# Patient Record
Sex: MALE | Race: WHITE | NOT HISPANIC OR LATINO | Employment: OTHER | URBAN - METROPOLITAN AREA
[De-identification: names, ages, dates, MRNs, and addresses within clinical notes are randomized per-mention and may not be internally consistent; named-entity substitution may affect disease eponyms.]

---

## 2017-03-01 ENCOUNTER — RECORDS - HEALTHEAST (OUTPATIENT)
Dept: LAB | Facility: CLINIC | Age: 82
End: 2017-03-01

## 2017-03-01 LAB
CHOLEST SERPL-MCNC: 158 MG/DL
FASTING STATUS PATIENT QL REPORTED: NORMAL
HDLC SERPL-MCNC: 57 MG/DL
LDLC SERPL CALC-MCNC: 77 MG/DL
TRIGL SERPL-MCNC: 120 MG/DL

## 2017-05-01 ASSESSMENT — MIFFLIN-ST. JEOR
SCORE: 1798.9
SCORE: 1798.9

## 2017-05-03 ENCOUNTER — SURGERY - HEALTHEAST (OUTPATIENT)
Dept: GASTROENTEROLOGY | Facility: CLINIC | Age: 82
End: 2017-05-03

## 2017-05-08 ASSESSMENT — MIFFLIN-ST. JEOR: SCORE: 1819.77

## 2017-05-09 ENCOUNTER — OFFICE VISIT - HEALTHEAST (OUTPATIENT)
Dept: GERIATRICS | Facility: CLINIC | Age: 82
End: 2017-05-09

## 2017-05-09 DIAGNOSIS — Z95.0 CARDIAC PACEMAKER IN SITU: ICD-10-CM

## 2017-05-09 DIAGNOSIS — R19.7 DIARRHEA WITH DEHYDRATION: ICD-10-CM

## 2017-05-09 DIAGNOSIS — R42 DIZZINESS: ICD-10-CM

## 2017-05-09 DIAGNOSIS — R19.7 DIARRHEA OF PRESUMED INFECTIOUS ORIGIN: ICD-10-CM

## 2017-05-09 DIAGNOSIS — N18.30 CHRONIC KIDNEY DISEASE, STAGE 3 (MODERATE): ICD-10-CM

## 2017-05-09 DIAGNOSIS — E11.9 TYPE 2 DIABETES MELLITUS WITHOUT COMPLICATION, WITHOUT LONG-TERM CURRENT USE OF INSULIN (H): ICD-10-CM

## 2017-05-09 DIAGNOSIS — I48.0 PAROXYSMAL ATRIAL FIBRILLATION (H): ICD-10-CM

## 2017-05-09 DIAGNOSIS — E78.5 HYPERLIPIDEMIA, UNSPECIFIED HYPERLIPIDEMIA TYPE: ICD-10-CM

## 2017-05-09 DIAGNOSIS — I44.1 MOBITZ TYPE II ATRIOVENTRICULAR BLOCK: ICD-10-CM

## 2017-05-09 DIAGNOSIS — M15.0 PRIMARY OSTEOARTHRITIS INVOLVING MULTIPLE JOINTS: ICD-10-CM

## 2017-05-09 DIAGNOSIS — I10 ESSENTIAL HYPERTENSION WITH GOAL BLOOD PRESSURE LESS THAN 140/90: ICD-10-CM

## 2017-05-11 ENCOUNTER — OFFICE VISIT - HEALTHEAST (OUTPATIENT)
Dept: GERIATRICS | Facility: CLINIC | Age: 82
End: 2017-05-11

## 2017-05-11 DIAGNOSIS — E11.9 TYPE 2 DIABETES MELLITUS WITHOUT COMPLICATION, WITHOUT LONG-TERM CURRENT USE OF INSULIN (H): ICD-10-CM

## 2017-05-11 DIAGNOSIS — I48.0 PAROXYSMAL ATRIAL FIBRILLATION (H): ICD-10-CM

## 2017-05-11 DIAGNOSIS — R42 DIZZINESS: ICD-10-CM

## 2017-05-11 DIAGNOSIS — R19.7 DIARRHEA WITH DEHYDRATION: ICD-10-CM

## 2017-05-11 DIAGNOSIS — I10 ESSENTIAL HYPERTENSION WITH GOAL BLOOD PRESSURE LESS THAN 140/90: ICD-10-CM

## 2017-05-16 ENCOUNTER — OFFICE VISIT - HEALTHEAST (OUTPATIENT)
Dept: GERIATRICS | Facility: CLINIC | Age: 82
End: 2017-05-16

## 2017-05-16 DIAGNOSIS — M15.0 PRIMARY OSTEOARTHRITIS INVOLVING MULTIPLE JOINTS: ICD-10-CM

## 2017-05-16 DIAGNOSIS — R42 DIZZINESS: ICD-10-CM

## 2017-05-16 DIAGNOSIS — I44.1 MOBITZ TYPE II ATRIOVENTRICULAR BLOCK: ICD-10-CM

## 2017-05-16 DIAGNOSIS — I10 ESSENTIAL HYPERTENSION WITH GOAL BLOOD PRESSURE LESS THAN 140/90: ICD-10-CM

## 2017-05-16 DIAGNOSIS — Z95.0 CARDIAC PACEMAKER IN SITU: ICD-10-CM

## 2017-05-16 DIAGNOSIS — N18.30 CHRONIC KIDNEY DISEASE, STAGE 3 (MODERATE): ICD-10-CM

## 2017-05-16 DIAGNOSIS — E11.9 TYPE 2 DIABETES MELLITUS WITHOUT COMPLICATION, WITHOUT LONG-TERM CURRENT USE OF INSULIN (H): ICD-10-CM

## 2017-05-16 DIAGNOSIS — E78.5 HYPERLIPIDEMIA, UNSPECIFIED HYPERLIPIDEMIA TYPE: ICD-10-CM

## 2017-05-16 DIAGNOSIS — R19.7 DIARRHEA WITH DEHYDRATION: ICD-10-CM

## 2017-05-16 DIAGNOSIS — I48.0 PAROXYSMAL ATRIAL FIBRILLATION (H): ICD-10-CM

## 2017-05-18 ENCOUNTER — OFFICE VISIT - HEALTHEAST (OUTPATIENT)
Dept: GERIATRICS | Facility: CLINIC | Age: 82
End: 2017-05-18

## 2017-05-18 DIAGNOSIS — R19.7 DIARRHEA WITH DEHYDRATION: ICD-10-CM

## 2017-05-18 DIAGNOSIS — I48.0 PAROXYSMAL ATRIAL FIBRILLATION (H): ICD-10-CM

## 2017-05-18 DIAGNOSIS — E11.9 TYPE 2 DIABETES MELLITUS WITHOUT COMPLICATION, WITHOUT LONG-TERM CURRENT USE OF INSULIN (H): ICD-10-CM

## 2017-05-18 DIAGNOSIS — I10 ESSENTIAL HYPERTENSION WITH GOAL BLOOD PRESSURE LESS THAN 140/90: ICD-10-CM

## 2017-05-18 DIAGNOSIS — N18.30 CHRONIC KIDNEY DISEASE, STAGE 3 (MODERATE): ICD-10-CM

## 2017-05-23 ENCOUNTER — OFFICE VISIT - HEALTHEAST (OUTPATIENT)
Dept: GERIATRICS | Facility: CLINIC | Age: 82
End: 2017-05-23

## 2017-05-23 DIAGNOSIS — M15.0 PRIMARY OSTEOARTHRITIS INVOLVING MULTIPLE JOINTS: ICD-10-CM

## 2017-05-23 DIAGNOSIS — N17.9 AKI (ACUTE KIDNEY INJURY) (H): ICD-10-CM

## 2017-05-23 DIAGNOSIS — R19.7 DIARRHEA WITH DEHYDRATION: ICD-10-CM

## 2017-05-23 DIAGNOSIS — I10 ESSENTIAL HYPERTENSION WITH GOAL BLOOD PRESSURE LESS THAN 140/90: ICD-10-CM

## 2017-05-23 DIAGNOSIS — R42 DIZZINESS: ICD-10-CM

## 2017-05-25 ENCOUNTER — OFFICE VISIT - HEALTHEAST (OUTPATIENT)
Dept: GERIATRICS | Facility: CLINIC | Age: 82
End: 2017-05-25

## 2017-05-25 DIAGNOSIS — N18.30 CHRONIC KIDNEY DISEASE, STAGE 3 (MODERATE): ICD-10-CM

## 2017-05-25 DIAGNOSIS — I44.1 MOBITZ TYPE II ATRIOVENTRICULAR BLOCK: ICD-10-CM

## 2017-05-25 DIAGNOSIS — R19.7 DIARRHEA WITH DEHYDRATION: ICD-10-CM

## 2017-05-25 DIAGNOSIS — I48.0 PAROXYSMAL ATRIAL FIBRILLATION (H): ICD-10-CM

## 2017-05-25 DIAGNOSIS — E11.9 TYPE 2 DIABETES MELLITUS WITHOUT COMPLICATION, WITHOUT LONG-TERM CURRENT USE OF INSULIN (H): ICD-10-CM

## 2017-05-25 DIAGNOSIS — R42 DIZZINESS: ICD-10-CM

## 2017-05-25 DIAGNOSIS — E78.5 HYPERLIPIDEMIA, UNSPECIFIED HYPERLIPIDEMIA TYPE: ICD-10-CM

## 2017-05-25 DIAGNOSIS — Z95.0 CARDIAC PACEMAKER IN SITU: ICD-10-CM

## 2017-05-25 DIAGNOSIS — E83.42 HYPOMAGNESEMIA: ICD-10-CM

## 2017-05-25 DIAGNOSIS — M15.0 PRIMARY OSTEOARTHRITIS INVOLVING MULTIPLE JOINTS: ICD-10-CM

## 2017-05-25 DIAGNOSIS — I10 ESSENTIAL HYPERTENSION WITH GOAL BLOOD PRESSURE LESS THAN 140/90: ICD-10-CM

## 2017-05-26 ENCOUNTER — AMBULATORY - HEALTHEAST (OUTPATIENT)
Dept: GERIATRICS | Facility: CLINIC | Age: 82
End: 2017-05-26

## 2017-05-26 RX ORDER — ACETAMINOPHEN 500 MG
1000 TABLET ORAL 3 TIMES DAILY PRN
Status: SHIPPED | COMMUNITY
Start: 2017-05-26

## 2017-05-30 ENCOUNTER — OFFICE VISIT - HEALTHEAST (OUTPATIENT)
Dept: GERIATRICS | Facility: CLINIC | Age: 82
End: 2017-05-30

## 2017-05-30 DIAGNOSIS — I10 ESSENTIAL HYPERTENSION WITH GOAL BLOOD PRESSURE LESS THAN 140/90: ICD-10-CM

## 2017-05-30 DIAGNOSIS — N18.30 CHRONIC KIDNEY DISEASE, STAGE 3 (MODERATE): ICD-10-CM

## 2017-05-30 DIAGNOSIS — M15.0 PRIMARY OSTEOARTHRITIS INVOLVING MULTIPLE JOINTS: ICD-10-CM

## 2017-05-30 DIAGNOSIS — I48.0 PAROXYSMAL ATRIAL FIBRILLATION (H): ICD-10-CM

## 2017-05-30 DIAGNOSIS — E11.9 TYPE 2 DIABETES MELLITUS WITHOUT COMPLICATION, WITHOUT LONG-TERM CURRENT USE OF INSULIN (H): ICD-10-CM

## 2017-05-30 DIAGNOSIS — N17.9 AKI (ACUTE KIDNEY INJURY) (H): ICD-10-CM

## 2017-06-01 ENCOUNTER — OFFICE VISIT - HEALTHEAST (OUTPATIENT)
Dept: GERIATRICS | Facility: CLINIC | Age: 82
End: 2017-06-01

## 2017-06-01 ENCOUNTER — AMBULATORY - HEALTHEAST (OUTPATIENT)
Dept: GERIATRICS | Facility: CLINIC | Age: 82
End: 2017-06-01

## 2017-06-01 DIAGNOSIS — N18.30 CHRONIC KIDNEY DISEASE, STAGE 3 (MODERATE): ICD-10-CM

## 2017-06-01 DIAGNOSIS — E11.9 TYPE 2 DIABETES MELLITUS WITHOUT COMPLICATION, WITHOUT LONG-TERM CURRENT USE OF INSULIN (H): ICD-10-CM

## 2017-06-01 DIAGNOSIS — I48.0 PAROXYSMAL ATRIAL FIBRILLATION (H): ICD-10-CM

## 2017-06-01 DIAGNOSIS — E78.5 HYPERLIPIDEMIA, UNSPECIFIED HYPERLIPIDEMIA TYPE: ICD-10-CM

## 2017-06-01 DIAGNOSIS — I44.1 MOBITZ TYPE II ATRIOVENTRICULAR BLOCK: ICD-10-CM

## 2017-06-01 DIAGNOSIS — Z95.0 CARDIAC PACEMAKER IN SITU: ICD-10-CM

## 2017-06-01 DIAGNOSIS — R19.7 DIARRHEA WITH DEHYDRATION: ICD-10-CM

## 2017-06-01 DIAGNOSIS — R42 DIZZINESS: ICD-10-CM

## 2017-06-01 DIAGNOSIS — E83.42 HYPOMAGNESEMIA: ICD-10-CM

## 2017-06-01 DIAGNOSIS — I10 ESSENTIAL HYPERTENSION WITH GOAL BLOOD PRESSURE LESS THAN 140/90: ICD-10-CM

## 2017-06-01 DIAGNOSIS — M15.0 PRIMARY OSTEOARTHRITIS INVOLVING MULTIPLE JOINTS: ICD-10-CM

## 2018-07-25 ENCOUNTER — RECORDS - HEALTHEAST (OUTPATIENT)
Dept: LAB | Facility: CLINIC | Age: 83
End: 2018-07-25

## 2018-07-25 LAB
ANION GAP SERPL CALCULATED.3IONS-SCNC: 10 MMOL/L (ref 5–18)
BUN SERPL-MCNC: 18 MG/DL (ref 8–28)
CALCIUM SERPL-MCNC: 8.3 MG/DL (ref 8.5–10.5)
CHLORIDE BLD-SCNC: 107 MMOL/L (ref 98–107)
CO2 SERPL-SCNC: 23 MMOL/L (ref 22–31)
CREAT SERPL-MCNC: 1.16 MG/DL (ref 0.7–1.3)
GFR SERPL CREATININE-BSD FRML MDRD: 60 ML/MIN/1.73M2
GLUCOSE BLD-MCNC: 113 MG/DL (ref 70–125)
MAGNESIUM SERPL-MCNC: 2.1 MG/DL (ref 1.8–2.6)
POTASSIUM BLD-SCNC: 4 MMOL/L (ref 3.5–5)
SODIUM SERPL-SCNC: 140 MMOL/L (ref 136–145)

## 2018-11-23 ENCOUNTER — RECORDS - HEALTHEAST (OUTPATIENT)
Dept: LAB | Facility: CLINIC | Age: 83
End: 2018-11-23

## 2018-11-23 LAB
ALBUMIN SERPL-MCNC: 3.5 G/DL (ref 3.5–5)
ALP SERPL-CCNC: 86 U/L (ref 45–120)
ALT SERPL W P-5'-P-CCNC: 9 U/L (ref 0–45)
ANION GAP SERPL CALCULATED.3IONS-SCNC: 9 MMOL/L (ref 5–18)
AST SERPL W P-5'-P-CCNC: 13 U/L (ref 0–40)
BILIRUB SERPL-MCNC: 0.7 MG/DL (ref 0–1)
BUN SERPL-MCNC: 28 MG/DL (ref 8–28)
CALCIUM SERPL-MCNC: 9.2 MG/DL (ref 8.5–10.5)
CHLORIDE BLD-SCNC: 108 MMOL/L (ref 98–107)
CO2 SERPL-SCNC: 25 MMOL/L (ref 22–31)
CREAT SERPL-MCNC: 1.36 MG/DL (ref 0.7–1.3)
GFR SERPL CREATININE-BSD FRML MDRD: 50 ML/MIN/1.73M2
GLUCOSE BLD-MCNC: 140 MG/DL (ref 70–125)
POTASSIUM BLD-SCNC: 4.2 MMOL/L (ref 3.5–5)
PROT SERPL-MCNC: 6.3 G/DL (ref 6–8)
SODIUM SERPL-SCNC: 142 MMOL/L (ref 136–145)
T4 FREE SERPL-MCNC: 1 NG/DL (ref 0.7–1.8)
TSH SERPL DL<=0.005 MIU/L-ACNC: 0.71 UIU/ML (ref 0.3–5)

## 2018-12-13 ENCOUNTER — RECORDS - HEALTHEAST (OUTPATIENT)
Dept: LAB | Facility: CLINIC | Age: 83
End: 2018-12-13

## 2018-12-13 LAB
CHOLEST SERPL-MCNC: 134 MG/DL
FASTING STATUS PATIENT QL REPORTED: ABNORMAL
HDLC SERPL-MCNC: 50 MG/DL
LDLC SERPL CALC-MCNC: 45 MG/DL
PHOSPHATE SERPL-MCNC: 3.2 MG/DL (ref 2.5–4.5)
PSA SERPL-MCNC: 0.8 NG/ML (ref 0–6.5)
PTH-INTACT SERPL-MCNC: 110 PG/ML (ref 10–86)
TRIGL SERPL-MCNC: 195 MG/DL

## 2018-12-14 LAB — 25(OH)D3 SERPL-MCNC: 12.5 NG/ML (ref 30–80)

## 2019-03-05 ENCOUNTER — RECORDS - HEALTHEAST (OUTPATIENT)
Dept: LAB | Facility: CLINIC | Age: 84
End: 2019-03-05

## 2019-03-06 LAB — 25(OH)D3 SERPL-MCNC: 32.8 NG/ML (ref 30–80)

## 2019-06-06 ENCOUNTER — RECORDS - HEALTHEAST (OUTPATIENT)
Dept: LAB | Facility: CLINIC | Age: 84
End: 2019-06-06

## 2019-06-06 LAB — PTH-INTACT SERPL-MCNC: 83 PG/ML (ref 10–86)

## 2019-07-15 ENCOUNTER — AMBULATORY - HEALTHEAST (OUTPATIENT)
Dept: CARDIOLOGY | Facility: CLINIC | Age: 84
End: 2019-07-15

## 2019-07-31 ENCOUNTER — HOSPITAL ENCOUNTER (OUTPATIENT)
Dept: MRI IMAGING | Facility: HOSPITAL | Age: 84
Discharge: HOME | End: 2019-07-31

## 2019-07-31 ENCOUNTER — HOSPITAL ENCOUNTER (OUTPATIENT)
Dept: MRI IMAGING | Facility: HOSPITAL | Age: 84
Discharge: HOME | End: 2019-07-31
Attending: INTERNAL MEDICINE

## 2019-07-31 ENCOUNTER — HOSPITAL ENCOUNTER (OUTPATIENT)
Dept: RADIOLOGY | Facility: HOSPITAL | Age: 84
Discharge: HOME | End: 2019-07-31

## 2019-07-31 DIAGNOSIS — M25.511 RIGHT SHOULDER PAIN, UNSPECIFIED CHRONICITY: ICD-10-CM

## 2019-12-11 ENCOUNTER — RECORDS - HEALTHEAST (OUTPATIENT)
Dept: LAB | Facility: CLINIC | Age: 84
End: 2019-12-11

## 2019-12-11 LAB
ALBUMIN SERPL-MCNC: 3.5 G/DL (ref 3.5–5)
ALP SERPL-CCNC: 72 U/L (ref 45–120)
ALT SERPL W P-5'-P-CCNC: 13 U/L (ref 0–45)
ANION GAP SERPL CALCULATED.3IONS-SCNC: 10 MMOL/L (ref 5–18)
AST SERPL W P-5'-P-CCNC: 13 U/L (ref 0–40)
BILIRUB SERPL-MCNC: 0.7 MG/DL (ref 0–1)
BUN SERPL-MCNC: 17 MG/DL (ref 8–28)
CALCIUM SERPL-MCNC: 9.1 MG/DL (ref 8.5–10.5)
CHLORIDE BLD-SCNC: 104 MMOL/L (ref 98–107)
CO2 SERPL-SCNC: 28 MMOL/L (ref 22–31)
CREAT SERPL-MCNC: 1.38 MG/DL (ref 0.7–1.3)
GFR SERPL CREATININE-BSD FRML MDRD: 49 ML/MIN/1.73M2
GLUCOSE BLD-MCNC: 192 MG/DL (ref 70–125)
POTASSIUM BLD-SCNC: 3.9 MMOL/L (ref 3.5–5)
PROT SERPL-MCNC: 6.4 G/DL (ref 6–8)
SODIUM SERPL-SCNC: 142 MMOL/L (ref 136–145)

## 2020-06-17 ENCOUNTER — RECORDS - HEALTHEAST (OUTPATIENT)
Dept: LAB | Facility: CLINIC | Age: 85
End: 2020-06-17

## 2020-06-17 LAB
ALBUMIN SERPL-MCNC: 3.6 G/DL (ref 3.5–5)
ALP SERPL-CCNC: 70 U/L (ref 45–120)
ALT SERPL W P-5'-P-CCNC: <9 U/L (ref 0–45)
ANION GAP SERPL CALCULATED.3IONS-SCNC: 9 MMOL/L (ref 5–18)
AST SERPL W P-5'-P-CCNC: 15 U/L (ref 0–40)
BILIRUB SERPL-MCNC: 0.7 MG/DL (ref 0–1)
BUN SERPL-MCNC: 26 MG/DL (ref 8–28)
CALCIUM SERPL-MCNC: 9.4 MG/DL (ref 8.5–10.5)
CHLORIDE BLD-SCNC: 104 MMOL/L (ref 98–107)
CO2 SERPL-SCNC: 28 MMOL/L (ref 22–31)
CREAT SERPL-MCNC: 1.57 MG/DL (ref 0.7–1.3)
GFR SERPL CREATININE-BSD FRML MDRD: 42 ML/MIN/1.73M2
GLUCOSE BLD-MCNC: 150 MG/DL (ref 70–125)
POTASSIUM BLD-SCNC: 4.6 MMOL/L (ref 3.5–5)
PROT SERPL-MCNC: 6.3 G/DL (ref 6–8)
SODIUM SERPL-SCNC: 141 MMOL/L (ref 136–145)
T4 FREE SERPL-MCNC: 1 NG/DL (ref 0.7–1.8)
TSH SERPL DL<=0.005 MIU/L-ACNC: 0.88 UIU/ML (ref 0.3–5)

## 2020-09-17 ENCOUNTER — RECORDS - HEALTHEAST (OUTPATIENT)
Dept: LAB | Facility: CLINIC | Age: 85
End: 2020-09-17

## 2020-09-18 LAB — 25(OH)D3 SERPL-MCNC: 42.4 NG/ML (ref 30–80)

## 2020-09-23 ENCOUNTER — RECORDS - HEALTHEAST (OUTPATIENT)
Dept: ADMINISTRATIVE | Facility: OTHER | Age: 85
End: 2020-09-23

## 2020-09-23 ENCOUNTER — AMBULATORY - HEALTHEAST (OUTPATIENT)
Dept: CARDIOLOGY | Facility: CLINIC | Age: 85
End: 2020-09-23

## 2020-09-23 DIAGNOSIS — Z95.0 CARDIAC PACEMAKER IN SITU: ICD-10-CM

## 2020-10-15 ENCOUNTER — HOSPITAL ENCOUNTER (OUTPATIENT)
Dept: RADIOLOGY | Facility: CLINIC | Age: 85
Discharge: HOME | End: 2020-10-15

## 2020-10-15 ENCOUNTER — HOSPITAL ENCOUNTER (OUTPATIENT)
Dept: MRI IMAGING | Facility: CLINIC | Age: 85
Discharge: HOME | End: 2020-10-15

## 2020-10-15 DIAGNOSIS — Z95.0 PACEMAKER: ICD-10-CM

## 2020-10-15 DIAGNOSIS — M47.817 LUMBAR AND SACRAL SPONDYLOARTHRITIS: ICD-10-CM

## 2021-03-30 ENCOUNTER — RECORDS - HEALTHEAST (OUTPATIENT)
Dept: LAB | Facility: CLINIC | Age: 86
End: 2021-03-30

## 2021-03-30 LAB
ANION GAP SERPL CALCULATED.3IONS-SCNC: 12 MMOL/L (ref 5–18)
BUN SERPL-MCNC: 23 MG/DL (ref 8–28)
CALCIUM SERPL-MCNC: 8.8 MG/DL (ref 8.5–10.5)
CHLORIDE BLD-SCNC: 108 MMOL/L (ref 98–107)
CO2 SERPL-SCNC: 22 MMOL/L (ref 22–31)
CREAT SERPL-MCNC: 1.41 MG/DL (ref 0.7–1.3)
GFR SERPL CREATININE-BSD FRML MDRD: 47 ML/MIN/1.73M2
GLUCOSE BLD-MCNC: 161 MG/DL (ref 70–125)
POTASSIUM BLD-SCNC: 4 MMOL/L (ref 3.5–5)
SODIUM SERPL-SCNC: 142 MMOL/L (ref 136–145)

## 2021-05-29 ENCOUNTER — RECORDS - HEALTHEAST (OUTPATIENT)
Dept: ADMINISTRATIVE | Facility: CLINIC | Age: 86
End: 2021-05-29

## 2021-05-30 VITALS — BODY MASS INDEX: 28.6 KG/M2 | WEIGHT: 230 LBS | HEIGHT: 75 IN

## 2021-05-30 VITALS — HEIGHT: 75 IN | WEIGHT: 234.6 LBS | BODY MASS INDEX: 29.17 KG/M2

## 2021-05-30 NOTE — PROGRESS NOTES
Implant note by Dr. Mae  3/19/2019 Implanted Left Pectoral [facility] [id number]        Lead Detail  Implanted Status Chamber Location  Model Serial Number     Chronic Right [facility] [facility] 2088TC [id number]   Chronic Right Ventricle [facility] [facility] 2088TC [id number]

## 2021-05-31 VITALS — BODY MASS INDEX: 29.32 KG/M2 | WEIGHT: 234.6 LBS

## 2021-05-31 VITALS — BODY MASS INDEX: 28.87 KG/M2 | WEIGHT: 231 LBS

## 2021-05-31 VITALS — WEIGHT: 234.6 LBS | BODY MASS INDEX: 29.32 KG/M2

## 2021-05-31 NOTE — PROGRESS NOTES
Device monitor of pt without problems.  
APPENDECTOMY      CARDIAC CATHETERIZATION  2016    Non Obs CAD    HAND SURGERY Right     HEMIARTHROPLASTY HIP Right 2019    RIGHT HIP HEMIARTHROPLASTY performed by Leata Babinski, DO at [address] [facility]  2016     History reviewed. No pertinent family history. Social History     Socioeconomic History    Marital status:      Spouse name: Not on file    Number of children: Not on file    Years of education: Not on file    Highest education level: Not on file   Occupational History    Not on file   Social Needs    Financial resource strain: Not on file    Food insecurity:     Worry: Not on file     Inability: Not on file    Transportation needs:     Medical: Not on file     Non-medical: Not on file   Tobacco Use    Smoking status: Former Smoker     Last attempt to quit: 2012     Years since quittin.0    Smokeless tobacco: Never Used   Substance and Sexual Activity    Alcohol use: No    Drug use: No    Sexual activity: Not on file   Lifestyle    Physical activity:     Days per week: Not on file     Minutes per session: Not on file    Stress: Not on file   Relationships    Social connections:     Talks on phone: Not on file     Gets together: Not on file     Attends Sikhism service: Not on file     Active member of club or organization: Not on file     Attends meetings of clubs or organizations: Not on file     Relationship status: Not on file    Intimate partner violence:     Fear of current or ex partner: Not on file     Emotionally abused: Not on file     Physically abused: Not on file     Forced sexual activity: Not on file   Other Topics Concern    Not on file   Social History Narrative    Not on file       Review of Systems:   · Constitutional: there has been no unanticipated weight loss. There's been no change in energy level, sleep pattern, or activity level. · Eyes: No visual changes or diplopia.  No

## 2021-06-10 NOTE — PROGRESS NOTES
[facility]    DATE: 2017  NAME: Tre Van  : 8/10/1932           MR# [id number]     CODE STATUS:  DNR  VISIT TYPE: Admission  FACILITY: [facility] [[id number]]    ROOM: 325  PRIMARY CARE PROVIDER: Deric Fermin MD Phone: [phone] Fax:[phone]    History of Present Illness:   Tre Van is a 84 y.o. male with An admission for diarrhea and dehydration which is now multiplied into a prolonged period of constipation. I have a wonderful chat with him. And he explains his family is requesting that he move in with them but he wishes to stay independent in his own two-story house and   sees no reason why that will not be successful. Today on exam he is quite comfortable with exam is below. He does have a posterior scalp laceration that occurred after a fall but didn't require stitches and only left him bruised on the right neck with no evidence of intracranial issue by ER evaluation.    Past Medical History:  Past Medical History:   Diagnosis Date     Cardiac pacemaker in situ      Chronic kidney disease      Diabetes mellitus     type II     Hyperlipidemia      Hypertension      Paroxysmal atrial fibrillation      Primary osteoarthritis involving multiple joints      Shingles        Past Surgical History:  Past Surgical History:   Procedure Laterality Date     CARDIAC PACEMAKER PLACEMENT Left 10/12/2012     CARPAL TUNNEL RELEASE Left      EYE SURGERY Bilateral     cataracts     gel injections to both knees Bilateral 2016     HERNIA REPAIR Left     inguinal     JOINT REPLACEMENT Left 2006    shoulder     [place] SIGMOIDOSCOPY FLX CONTROL BLEEDING N/A 5/3/2017    Procedure: SIGMOIDOSCOPY with biopsy ;  Surgeon: Maddison Taylor MD;  Location: [facility];  Service: Gastroenterology     TONSILLECTOMY         Allergies:  No Known Allergies    Social History:  Social History     Social History     Marital status:      Spouse name: N/A      Number of children: N/A     Years of education: N/A     Occupational History     [employer]      Retired     Social History Main Topics     Smoking status: Former Smoker     Packs/day: 1.50     Years: 23.00     Quit date: 1/1/1967     Smokeless tobacco: Never Used     Alcohol use Yes      Comment: Small amounts of alcohol on special occasions     Drug use: No     Sexual activity: Not on file     Other Topics Concern     Not on file     Social History Narrative    Lives alone. In 2 story house.  5/2017       Family History:  Family History   Problem Relation Age of Onset     No Medical Problems Mother      Bled to death age 87     Heart disease Father 56     Colitis Son        Current Medications:  Current Outpatient Prescriptions   Medication Sig     aspirin 81 mg chewable tablet Chew 1 tablet (81 mg total) daily.     escitalopram oxalate (LEXAPRO) 10 MG tablet Take 1 tablet (10 mg total) by mouth daily.     fluticasone (FLONASE) 50 mcg/actuation nasal spray 1 spray into each nostril daily as needed for rhinitis.     loperamide (IMODIUM) 2 mg capsule Take 1 capsule (2 mg total) by mouth 3 (three) times a day as needed for diarrhea.     magnesium 200 mg Tab Take 200 mg by mouth 2 (two) times a day for 5 days.     metFORMIN (GLUCOPHAGE) 500 MG tablet Take 0.5 tablets (250 mg total) by mouth 2 (two) times a day with meals.     mometasone (ELOCON) 0.1 % cream Apply 1 application topically daily.     rivaroxaban 20 mg Tab Take 1 tablet (20 mg total) by mouth daily with supper.     simvastatin (ZOCOR) 40 MG tablet Take 1 tablet (40 mg total) by mouth at bedtime.     sotalol (BETAPACE) 80 MG tablet Take 1 tablet (80 mg total) by mouth 2 (two) times a day.       Review of Systems:  History obtained from chart review and the patient  General ROS: positive for  - fatigue  negative for - chills or fever  Psychological ROS: negative for - concentration difficulties, disorientation, hallucinations or memory  "difficulties  Ophthalmic ROS: negative for - decreased vision, double vision or loss of vision  ENT ROS: negative for - nasal congestion, nasal discharge or sore throat  Respiratory ROS: no cough, shortness of breath, or wheezing  Cardiovascular ROS: no chest pain or dyspnea on exertion  Gastrointestinal ROS: no abdominal pain, change in bowel habits, or black or bloody stools  Genito-Urinary ROS: no dysuria, trouble voiding, or hematuria  Musculoskeletal ROS: Generally stiff and sore but doing well in therapy  Neurological ROS: no TIA or stroke symptoms  Dermatological ROS: with the exception of the  bruise on his scalp and neck negative       Physical Examination:  /71  Pulse 70  Temp 97.1  F (36.2  C)  Resp 17  Ht 6' 3\" (1.905 m)  Wt (!) 234 lb 9.6 oz (106.4 kg)  SpO2 96%  BMI 29.32 kg/m2  General appearance: alert, appears stated age, cooperative, distracted, fatigued, mild distress, morbidly obese and slowed mentation  Head: Normocephalic, without obvious abnormality, atraumatic  Eyes: conjunctivae/corneas clear. PERRL, EOM's intact. Fundi benign.  Nose: Nares normal. Septum midline. Mucosa normal. No drainage or sinus tenderness.  Throat: lips, mucosa, and tongue normal; teeth and gums normal  Neck: no adenopathy, no carotid bruit, no JVD, supple, symmetrical, trachea midline and thyroid not enlarged, symmetric, no tenderness/mass/nodules  Back: symmetric, no curvature. ROM normal. No CVA tenderness.  Lungs: clear to auscultation bilaterally  Chest wall: no tenderness  Heart: regular rate and rhythm, S1, S2 normal, no murmur, click, rub or gallop  Abdomen: soft, non-tender; bowel sounds normal; no masses,  no organomegaly and obese  Rectal: deferred and but nursing circ service reports a full rectum with semi soft stool  Extremities: extremities normal, atraumatic, no cyanosis or edema  Pulses: 2+ and symmetric  Skin: only bruise as above  Neurologic: Mental status: Alert, oriented, thought " content appropriate  Motor:minimally diminished strength and balance       Impression:  Tre Van is a 84 y.o. male with Diarrhea and dehydration now resolved with constipation    1. Diarrhea of presumed infectious origin     2. Diarrhea with dehydration     3. Dizziness     4. Essential hypertension with goal blood pressure less than 140/90     5. Paroxysmal atrial fibrillation     6. Mobitz type II atrioventricular block     7. Cardiac pacemaker in situ     8. Chronic kidney disease, stage 3 (moderate)     9. Primary osteoarthritis involving multiple joints     10. Hyperlipidemia, unspecified hyperlipidemia type     11. Type 2 diabetes mellitus without complication, without long-term current use of insulin           Plan: Will work with him for clearing the Rectum. I would hope for relatively early discharge if we can achieve his previous functional level    Electronically signed by: Josh Mancia Sr., MD

## 2021-06-10 NOTE — PROGRESS NOTES
"[facility]    Facility:   [facility] [[id number]]   Code Status: DNR      CHIEF COMPLAINT/REASON FOR VISIT:  Chief Complaint   Patient presents with     Review Of Multiple Medical Conditions       HISTORY:      HPI: Tre is a 84 y.o. male undergoing physical and occupational therapy at [facility]. He was admitted to the hospital on 5/2/17 after approximately a week of diarrhea and dehydration. Prior to the episode of diarrhea he tells me he was reaching down for his cane and fell backwards. He went to the ER on 4/23 after this fall and was discharged the same day due to no internal injuries.He did sustain a small laceration to the back of the head and bruising posterior neck and shoulder. Per records he was having 13 stools per day. There was no blood nor abdominal pain. The diarrhea subsided and he then became constipated. He was seen today as a routine visit and tells me he is now having formed stools.His abdomen was soft and non tender. Bowel sounds were active x4 quadrants.  He is on Xarelto for atrial fibrillation. He denies SOB/CP.  He reports he did order  [employer].He does report right knee pain 7/10 in which he tells me affects his PT. He declines to ask for pain medication and responds, ' I will tough it out\" I explained to him that taking the pain medication will help optimize his therapy sessions. He agreed to take the tramadol and I requested the nurse to bring it to him. I also scheduled a dose in the AM prior to therapy.    Past Medical History:   Diagnosis Date     Cardiac pacemaker in situ      Chronic kidney disease      Diabetes mellitus     type II     Hyperlipidemia      Hypertension      Paroxysmal atrial fibrillation      Primary osteoarthritis involving multiple joints      Shingles 2002             Family History   Problem Relation Age of Onset     No Medical Problems Mother      Bled to death age 87     Heart disease Father 56     Colitis " Son      Social History     Social History     Marital status:      Spouse name: N/A     Number of children: N/A     Years of education: N/A     Occupational History     [employer]      Retired     Social History Main Topics     Smoking status: Former Smoker     Packs/day: 1.50     Years: 23.00     Quit date: 1/1/1967     Smokeless tobacco: Never Used     Alcohol use Yes      Comment: Small amounts of alcohol on special occasions     Drug use: No     Sexual activity: Not on file     Other Topics Concern     Not on file     Social History Narrative    Lives alone. In 2 story house.  5/2017         Review of Systems   Constitutional: Negative for appetite change, chills and fever.   HENT: Negative for congestion and sore throat.    Respiratory: Negative for cough and shortness of breath.    Cardiovascular: Negative for chest pain and leg swelling.   Gastrointestinal: Negative for abdominal distention, abdominal pain, constipation, diarrhea and nausea.        Admitted with diarrhea, then became constipated. He now reports he is having regular bowel movements   Genitourinary: Negative for dysuria.   Musculoskeletal: Positive for arthralgias.        Bilateral knee pain, right >left.   Neurological: Positive for dizziness.        HX dizziness/ denies today   Hematological: Bruises/bleeds easily.       .  Vitals:    05/23/17 1414   BP: 120/69   Pulse: 69   Resp: 19   Temp: 97.5  F (36.4  C)   SpO2: 96%   Weight: (!) 234 lb 9.6 oz (106.4 kg)       Physical Exam   Constitutional: He is oriented to person, place, and time. He appears well-developed and well-nourished.   HENT:   Head: Normocephalic.   Eyes: Conjunctivae are normal.   Neck: No tracheal deviation present.   Cardiovascular: Normal rate.    irregularly irregular, rate controlled  pacemaker   Pulmonary/Chest: Effort normal and breath sounds normal. He has no wheezes. He has no rales.   Abdominal: Soft. Bowel sounds are normal. He exhibits no  distension. There is no tenderness.   Musculoskeletal: He exhibits tenderness. He exhibits no edema.   Limited ROM left arm- HX of total left shoulder 9 years ago.  Pain tenderness right knee.   Neurological: He is alert and oriented to person, place, and time.   Skin: Skin is warm and dry.   Small laceration posterior scalp- BUCK no sutures needed  Posterior neck bruising bilateral fading  Bruising left arm.  Faded bruise posterior right shoulder         LABS:   Mag 5/12/17  1.7  5/6/17  Magnesium-1.6 replaced x 5 days.  K-4.1      ASSESSMENT:      ICD-10-CM    1. Dizziness R42    2. Essential hypertension with goal blood pressure less than 140/90 I10    3. JÚNIOR (acute kidney injury) N17.9    4. Primary osteoarthritis involving multiple joints M15.0    5. Diarrhea with dehydration R19.7        PLAN:    Type 2 diabetes- continue Metformin and fingersticks.  Hypertension- stable continue sotalol  Diarrhea- loperamide PRN- resolved at this time  Atrial fibrillation- on Xarelto  Continue to optimize therapy  Pain management- schedule tramadol prior to therapy and then prn  Low magnesium- start magnesium oxide daily  Hematuria-  Isolated x1-hgb done and will check another one  tomorrow. U/a showed no blood in the urine.            Electronically signed by: Mary Jo Ferguson CNP

## 2021-06-10 NOTE — PROGRESS NOTES
[facility]    Facility:   [facility] [[id number]]   Code Status: UNKNOWN      CHIEF COMPLAINT/REASON FOR VISIT:  Chief Complaint   Patient presents with     Review Of Multiple Medical Conditions       HISTORY:      HPI: Tre is a 84 y.o. male undergoing physical and occupational therapy at [facility]. He was admitted to the hospital on 5/2/17 after approximately a week of diarrhea and dehydration. Prior to the episode of diarrhea he tells me he was reaching down for his cane and fell backwards. He went to the ER on 4/23 after this fall and was discharged the same day due to no internal injuries.He did sustain a small laceration to the back of the head and bruising posterior neck and shoulder. Per records he was having 13 stools per day. There was no blood nor abdominal pain. The diarrhea subsided and he then became constipated. He was seen today as a routine visit and tells me the constipation has resolved after a suppository and prune juice. His abdomen was soft and non tender. Bowel sounds were active x4 quadrants.His only report was increased pain right knee 8/10.. He tells he was suppose to have knee surgery but declined it because he was the caregiver for his wife and needed to take care of her. He has been having outpatient therapy and getting a series of knee injection in which he did not receive the last one.  He is on Xarelto for atrial fibrillation.  He denies SOB and tells he he will possible discharge next week.    Past Medical History:   Diagnosis Date     Cardiac pacemaker in situ      Chronic kidney disease      Diabetes mellitus     type II     Hyperlipidemia      Hypertension      Paroxysmal atrial fibrillation      Primary osteoarthritis involving multiple joints      Shingles 2002             Family History   Problem Relation Age of Onset     No Medical Problems Mother      Bled to death age 87     Heart disease Father 56     Colitis Son       Social History     Social History     Marital status:      Spouse name: N/A     Number of children: N/A     Years of education: N/A     Occupational History     [employer]      Retired     Social History Main Topics     Smoking status: Former Smoker     Packs/day: 1.50     Years: 23.00     Quit date: 1/1/1967     Smokeless tobacco: Never Used     Alcohol use Yes      Comment: Small amounts of alcohol on special occasions     Drug use: No     Sexual activity: Not on file     Other Topics Concern     Not on file     Social History Narrative    Lives alone. In 2 story house.  5/2017         Review of Systems   Constitutional: Negative for appetite change, chills and fever.   HENT: Negative for congestion and sore throat.    Respiratory: Negative for cough and shortness of breath.    Cardiovascular: Negative for chest pain and leg swelling.   Gastrointestinal: Positive for constipation and diarrhea. Negative for abdominal distention, abdominal pain and nausea.        Admitted with diarrhea, then became constipated.   Genitourinary: Negative for dysuria.   Musculoskeletal: Positive for arthralgias.        Bilateral knee pain, right >left.- Has had injections. He reports he was due for one but became sick.   Neurological: Positive for dizziness.        HX dizziness/ denies today   Hematological: Bruises/bleeds easily.       .  Vitals:    05/10/17 2124   BP: 111/73   Pulse: 70   Resp: 18   Temp: 99.6  F (37.6  C)   SpO2: 94%   Weight: (!) 234 lb 9.6 oz (106.4 kg)       Physical Exam   Constitutional: He is oriented to person, place, and time. He appears well-developed and well-nourished.   HENT:   Head: Normocephalic.   Eyes: Conjunctivae are normal.   Neck: No tracheal deviation present.   Cardiovascular: Normal rate.    irregularly irregular, rate controlled  pacemaker   Pulmonary/Chest: Effort normal and breath sounds normal. He has no wheezes. He has no rales.   Abdominal: Soft. Bowel sounds are  normal. He exhibits no distension. There is no tenderness.   Musculoskeletal: He exhibits edema and tenderness.   Limited ROM left arm- HX of total left shoulder 9 years ago.  Pain tenderness right knee.   Neurological: He is alert and oriented to person, place, and time.   Skin: Skin is warm and dry.   Small laceration posterior scalp- BUCK no sutures needed  Posterior neck bruising bilateral  Bruising left arm.  Faded bruise posterior right shoulder         LABS:   5/6/17  Magnesium-1.6 replaced x 5 days.  K-4.1      ASSESSMENT:      ICD-10-CM    1. Type 2 diabetes mellitus without complication, without long-term current use of insulin E11.9    2. Diarrhea with dehydration R19.7    3. Paroxysmal atrial fibrillation I48.0    4. Essential hypertension with goal blood pressure less than 140/90 I10    5. Dizziness R42        PLAN:    Right knee pain- tramadol prn   Type 2 diabetes- continue Metformin and fingersticks.  Hypertension- stable continue sotalol  Diarrhea- loperamide PRN- resolved at this time  Atrial fibrillation- on Xarelto  Hypomagnesemia- replaced will recheck lab  Continue to optimize therapy          Electronically signed by: Mary Jo Ferguson, CNP

## 2021-06-10 NOTE — PROGRESS NOTES
[facility]    DATE: 2017    NAME: Tre Van  : 8/10/1932           MR# [id number]     CODE STATUS:  DNR      VISIT TYPE: Problem   FACILITY: [facility] [[id number]]    ROOM: 325    PRIMARY CARE PROVIDER: Deric Fermin MD Phone: [phone] Fax:[phone]    History of Present Illness:   Tre Van is a 84 y.o. male with With the recent bout of deconditioning secondary to diarrhea and dehydration. His diarrhea has resolved in fact at times he's more concerned about constipation. His strength is returning and placement efforts are underway. As we speak today he is concerned about the functioning of his roommate more than his own. He reports that he's up and moving has visited with his family and his Family is very pleased with his physical progress    Past Medical History:  Past Medical History:   Diagnosis Date     Cardiac pacemaker in situ      Chronic kidney disease      Diabetes mellitus     type II     Hyperlipidemia      Hypertension      Paroxysmal atrial fibrillation      Primary osteoarthritis involving multiple joints      Shingles 2002       Allergies:  No Known Allergies    Current Medications:  Current Outpatient Prescriptions   Medication Sig     aspirin 81 mg chewable tablet Chew 1 tablet (81 mg total) daily.     escitalopram oxalate (LEXAPRO) 10 MG tablet Take 1 tablet (10 mg total) by mouth daily.     fluticasone (FLONASE) 50 mcg/actuation nasal spray 1 spray into each nostril daily as needed for rhinitis.     loperamide (IMODIUM) 2 mg capsule Take 1 capsule (2 mg total) by mouth 3 (three) times a day as needed for diarrhea.     magnesium oxide (MAG-OX) 400 mg tablet Take 400 mg by mouth daily.     metFORMIN (GLUCOPHAGE) 500 MG tablet Take 0.5 tablets (250 mg total) by mouth 2 (two) times a day with meals.     mometasone (ELOCON) 0.1 % cream Apply 1 application topically daily.     oseltamivir (TAMIFLU) 30 MG capsule Take 30 mg  by mouth daily.     rivaroxaban 20 mg Tab Take 1 tablet (20 mg total) by mouth daily with supper.     simvastatin (ZOCOR) 40 MG tablet Take 1 tablet (40 mg total) by mouth at bedtime.     sotalol (BETAPACE) 80 MG tablet Take 1 tablet (80 mg total) by mouth 2 (two) times a day.     traMADol (ULTRAM) 50 mg tablet Take 25 mg by mouth daily. And 25 mg every 6 hours prn       Review of Systems:  History obtained from chart review and the patient  Respiratory ROS: no cough, shortness of breath, or wheezing  Cardiovascular ROS: no chest pain or dyspnea on exertion  Gastrointestinal ROS: no abdominal pain, change in bowel habits, or black or bloody stools  Genito-Urinary ROS: no dysuria, trouble voiding, or hematuria  Musculoskeletal ROS: Steady improvement of strength with marked improvement of balance  Neurological ROS: no TIA or stroke symptoms  Dermatological ROS: he has no open skin lesions         Laboratory:  No results for input(s): INR in the last 72 hours.  None    Physical Examination:  /59  Pulse 69  Temp 97.5  F (36.4  C)  Resp 18  Wt (!) 234 lb 9.6 oz (106.4 kg)  SpO2 97%  BMI 29.32 kg/m2  General appearance: alert, appears stated age, cooperative and no distress  Neck: no adenopathy, no carotid bruit, no JVD, supple, symmetrical, trachea midline and thyroid not enlarged, symmetric, no tenderness/mass/nodules  Lungs: clear to auscultation bilaterally  Heart: regular rate and rhythm, S1, S2 normal, no murmur, click, rub or gallop  Abdomen: soft, non-tender; bowel sounds normal; no masses,  no organomegaly  Extremities: extremities normal, atraumatic, no cyanosis or edema  Pulses: 2+ and symmetric  Skin: Skin color, texture, turgor normal. No rashes or lesions  Neurologic: Grossly normal       Impression:  Tre Van is a 84 y.o. male with Excellent recovery from about of deconditioning associated with diarrheal disease    1. Diarrhea with dehydration     2. Dizziness     3. Essential  hypertension with goal blood pressure less than 140/90     4. Paroxysmal atrial fibrillation     5. Mobitz type II atrioventricular block     6. Cardiac pacemaker in situ     7. Chronic kidney disease, stage 3 (moderate)     8. Primary osteoarthritis involving multiple joints     9. Hyperlipidemia, unspecified hyperlipidemia type     10. Type 2 diabetes mellitus without complication, without long-term current use of insulin     11. Hypomagnesemia         Plan: Discharge planning is underway. Therapy believes he's approaching maximum benefit from transitional care placement    Electronically signed by: Josh Mancia Sr., MD

## 2021-06-10 NOTE — PROGRESS NOTES
[facility]    Facility:   [facility] [[id number]]   Code Status: UNKNOWN      CHIEF COMPLAINT/REASON FOR VISIT:  Chief Complaint   Patient presents with     Review Of Multiple Medical Conditions       HISTORY:      HPI: Tre is a 84 y.o. male undergoing physical and occupational therapy at [facility]. He was admitted to the hospital on 5/2/17 after approximately a week of diarrhea and dehydration. Prior to the episode of diarrhea he tells me he was reaching down for his cane and fell backwards. He went to the ER on 4/23 after this fall and was discharged the same day due to no internal injuries.He did sustain a small laceration to the back of the head and bruising posterior neck and shoulder. Per records he was having 13 stools per day. There was no blood nor abdominal pain. The diarrhea subsided and he then became constipated. He was seen today as a routine visit and tells me he is now having formed stools.His abdomen was soft and non tender. Bowel sounds were active x4 quadrants.  He is on Xarelto for atrial fibrillation.  Today he tells me he had an episode of bloody urine x1. He denied any pain with urination. I will check a U/A and HGB.  He denies SOB/CP.  He has approx 2-3 more weeks of therapy. He reports he did order  [employer].     Past Medical History:   Diagnosis Date     Cardiac pacemaker in situ      Chronic kidney disease      Diabetes mellitus     type II     Hyperlipidemia      Hypertension      Paroxysmal atrial fibrillation      Primary osteoarthritis involving multiple joints      Shingles 2002             Family History   Problem Relation Age of Onset     No Medical Problems Mother      Bled to death age 87     Heart disease Father 56     Colitis Son      Social History     Social History     Marital status:      Spouse name: N/A     Number of children: N/A     Years of education: N/A     Occupational History     [employer]       Retired     Social History Main Topics     Smoking status: Former Smoker     Packs/day: 1.50     Years: 23.00     Quit date: 1/1/1967     Smokeless tobacco: Never Used     Alcohol use Yes      Comment: Small amounts of alcohol on special occasions     Drug use: No     Sexual activity: Not on file     Other Topics Concern     Not on file     Social History Narrative    Lives alone. In 2 [place] house.  5/2017         Review of Systems   Constitutional: Negative for appetite change, chills and fever.   HENT: Negative for congestion and sore throat.    Respiratory: Negative for cough and shortness of breath.    Cardiovascular: Negative for chest pain and leg swelling.   Gastrointestinal: Positive for constipation and diarrhea. Negative for abdominal distention, abdominal pain and nausea.        Admitted with diarrhea, then became constipated. He now reports he is having regular bowel movements   Genitourinary: Negative for dysuria.        Reported 1 isolated case of hematuria last night.   Musculoskeletal: Positive for arthralgias.        Bilateral knee pain, right >left.   Neurological: Positive for dizziness.        HX dizziness/ denies today   Hematological: Bruises/bleeds easily.       .  Vitals:    05/18/17 0922   BP: 99/60   Pulse: 83   Resp: 18   Temp: 96.8  F (36  C)   Weight: (!) 234 lb 9.6 oz (106.4 kg)       Physical Exam   Constitutional: He is oriented to person, place, and time. He appears well-developed and well-nourished.   HENT:   Head: Normocephalic.   Eyes: Conjunctivae are normal.   Neck: No tracheal deviation present.   Cardiovascular: Normal rate.    irregularly irregular, rate controlled  pacemaker   Pulmonary/Chest: Effort normal and breath sounds normal. He has no wheezes. He has no rales.   Abdominal: Soft. Bowel sounds are normal. He exhibits no distension. There is no tenderness.   Musculoskeletal: He exhibits tenderness. He exhibits no edema.   Limited ROM left arm- HX of total left shoulder  9 years ago.  Pain tenderness right knee.   Neurological: He is alert and oriented to person, place, and time.   Skin: Skin is warm and dry.   Small laceration posterior scalp- BUCK no sutures needed  Posterior neck bruising bilateral fading  Bruising left arm.  Faded bruise posterior right shoulder         LABS:   Mag 5/12/17  1.7  5/6/17  Magnesium-1.6 replaced x 5 days.  K-4.1      ASSESSMENT:      ICD-10-CM    1. Type 2 diabetes mellitus without complication, without long-term current use of insulin E11.9    2. Paroxysmal atrial fibrillation I48.0    3. Diarrhea with dehydration R19.7    4. Chronic kidney disease, stage 3 (moderate) N18.3    5. Essential hypertension with goal blood pressure less than 140/90 I10        PLAN:    Type 2 diabetes- continue Metformin and fingersticks.  Hypertension- stable continue sotalol  Diarrhea- loperamide PRN- resolved at this time  Atrial fibrillation- on Xarelto  Hypomagnesemia- replaced will recheck lab  Continue to optimize therapy  Hematuria episode x1. UA/HGB/          Electronically signed by: Mary Jo Ferguson CNP

## 2021-06-10 NOTE — PROGRESS NOTES
[facility]    DATE: 2017    NAME: Tre Van  : 8/10/1932           MR# [id number]     CODE STATUS:  DNR      VISIT TYPE: Problem   FACILITY: [facility] [[id number]]    ROOM: 325    PRIMARY CARE PROVIDER: Deric Fermin MD Phone: [phone] Fax:[phone]    History of Present Illness:   Tre Van is a 84 y.o. male with Recent bout of diarrhea and dehydration from which he is gradually regaining strength. As we speak today he's impressed how much better he's doing but acknowledges that being at home would be impossible for him at this point. He is steadily improving in therapy and a visit with both him and his son today.    Past Medical History:  Past Medical History:   Diagnosis Date     Cardiac pacemaker in situ      Chronic kidney disease      Diabetes mellitus     type II     Hyperlipidemia      Hypertension      Paroxysmal atrial fibrillation      Primary osteoarthritis involving multiple joints      Shingles        Allergies:  No Known Allergies    Current Medications:  Current Outpatient Prescriptions   Medication Sig     aspirin 81 mg chewable tablet Chew 1 tablet (81 mg total) daily.     escitalopram oxalate (LEXAPRO) 10 MG tablet Take 1 tablet (10 mg total) by mouth daily.     fluticasone (FLONASE) 50 mcg/actuation nasal spray 1 spray into each nostril daily as needed for rhinitis.     loperamide (IMODIUM) 2 mg capsule Take 1 capsule (2 mg total) by mouth 3 (three) times a day as needed for diarrhea.     metFORMIN (GLUCOPHAGE) 500 MG tablet Take 0.5 tablets (250 mg total) by mouth 2 (two) times a day with meals.     mometasone (ELOCON) 0.1 % cream Apply 1 application topically daily.     oseltamivir (TAMIFLU) 30 MG capsule Take 30 mg by mouth daily.     rivaroxaban 20 mg Tab Take 1 tablet (20 mg total) by mouth daily with supper.     simvastatin (ZOCOR) 40 MG tablet Take 1 tablet (40 mg total) by mouth at bedtime.     sotalol  (BETAPACE) 80 MG tablet Take 1 tablet (80 mg total) by mouth 2 (two) times a day.     traMADol (ULTRAM) 50 mg tablet Take 25 mg by mouth every 6 (six) hours as needed for pain.       Review of Systems:  History obtained from chart review and the patient  Respiratory ROS: no cough, shortness of breath, or wheezing  Cardiovascular ROS: no chest pain or dyspnea on exertion  Gastrointestinal ROS: no abdominal pain, change in bowel habits, or black or bloody stools Constipation improving  Genito-Urinary ROS: no dysuria, trouble voiding, or hematuria  Musculoskeletal ROS: Generally increasing in strength and endurance with no localized limitation  Neurological ROS: no TIA or stroke symptoms  Dermatological ROS: he denies skin lesions         Laboratory:  No results for input(s): INR in the last 72 hours.  None    Physical Examination:  /79  Pulse 84  Temp (!) 96.3  F (35.7  C)  Resp 16  Wt (!) 234 lb 9.6 oz (106.4 kg)  SpO2 91%  BMI 29.32 kg/m2  General appearance: alert, appears stated age, cooperative, flushed, no distress and moderately obese  Neck: no adenopathy, no carotid bruit, no JVD, supple, symmetrical, trachea midline and thyroid not enlarged, symmetric, no tenderness/mass/nodules  Lungs: clear to auscultation bilaterally  Heart: regular rate and rhythm, S1, S2 normal, no murmur, click, rub or gallop  Abdomen: soft, non-tender; bowel sounds normal; no masses,  no organomegaly  Extremities: extremities normal, atraumatic, no cyanosis or edema  Pulses: 2+ and symmetric  Skin: Skin color, texture, turgor normal. No rashes or lesions  Neurologic: Grossly normal       Impression:  Tre Van is a 84 y.o. male with History of diarrhea dehydration and weakness now steadily improving    1. Diarrhea with dehydration     2. Dizziness     3. Essential hypertension with goal blood pressure less than 140/90     4. Paroxysmal atrial fibrillation     5. Mobitz type II atrioventricular block     6. Cardiac  pacemaker in situ     7. Chronic kidney disease, stage 3 (moderate)     8. Primary osteoarthritis involving multiple joints     9. Hyperlipidemia, unspecified hyperlipidemia type     10. Type 2 diabetes mellitus without complication, without long-term current use of insulin         Plan: Will continue to push them forward in therapy in hopes of regaining his previously cherished independence    Electronically signed by: Josh Mancia Sr., MD

## 2021-06-10 NOTE — PROGRESS NOTES
[facility]    Facility:   [facility] [[id number]]   Code Status: DNR      CHIEF COMPLAINT/REASON FOR VISIT:  Chief Complaint   Patient presents with     Review Of Multiple Medical Conditions       HISTORY:      HPI: Tre is a 84 y.o. male undergoing physical and occupational therapy at [facility]. He was admitted to the hospital on 5/2/17 after approximately a week of diarrhea and dehydration. Prior to the episode of diarrhea he tells me he was reaching down for his cane and fell backwards. He went to the ER on 4/23 after this fall and was discharged the same day due to no internal injuries.He did sustain a small laceration to the back of the head and bruising posterior neck and shoulder. Per records he was having 13 stools per day. There was no blood nor abdominal pain. The diarrhea subsided and he then became constipated. He was seen today as a routine visit and tells me he is now having formed stools.His abdomen was soft and non tender. Bowel sounds were active x4 quadrants.  He is on Xarelto for atrial fibrillation. He denies SOB/CP.  He reports the tramadol is helpful prior to therapy. His only report today is constipation. I will schedule bowel medications. He is progressing in therapy    Past Medical History:   Diagnosis Date     Cardiac pacemaker in situ      Chronic kidney disease      Diabetes mellitus     type II     Hyperlipidemia      Hypertension      Paroxysmal atrial fibrillation      Primary osteoarthritis involving multiple joints      Shingles 2002             Family History   Problem Relation Age of Onset     No Medical Problems Mother      Bled to death age 87     Heart disease Father 56     Colitis Son      Social History     Social History     Marital status:      Spouse name: N/A     Number of children: N/A     Years of education: N/A     Occupational History     [employer]      Retired     Social History Main Topics      Smoking status: Former Smoker     Packs/day: 1.50     Years: 23.00     Quit date: 1/1/1967     Smokeless tobacco: Never Used     Alcohol use Yes      Comment: Small amounts of alcohol on special occasions     Drug use: No     Sexual activity: Not on file     Other Topics Concern     Not on file     Social History Narrative    Lives alone. In 2 story house.  5/2017         Review of Systems   Constitutional: Negative for appetite change, chills and fever.   HENT: Negative for congestion and sore throat.    Respiratory: Negative for cough and shortness of breath.    Cardiovascular: Negative for chest pain and leg swelling.   Gastrointestinal: Positive for constipation. Negative for abdominal distention, abdominal pain, diarrhea and nausea.   Genitourinary: Negative for dysuria.   Musculoskeletal: Positive for arthralgias.        Bilateral knee pain, right >left.   Neurological: Positive for dizziness.        HX dizziness/ denies today   Hematological: Bruises/bleeds easily.       .  Vitals:    05/29/17 2328   BP: 101/59   Pulse: 79   Resp: 21   Temp: (!) 95.2  F (35.1  C)   SpO2: 93%   Weight: (!) 234 lb 9.6 oz (106.4 kg)       Physical Exam   Constitutional: He is oriented to person, place, and time. He appears well-developed and well-nourished.   HENT:   Head: Normocephalic.   Eyes: Conjunctivae are normal.   Neck: No tracheal deviation present.   Cardiovascular: Normal rate.    irregularly irregular, rate controlled  pacemaker   Pulmonary/Chest: Effort normal and breath sounds normal. He has no wheezes. He has no rales.   Abdominal: Soft. Bowel sounds are normal. He exhibits no distension. There is no tenderness.   Musculoskeletal: He exhibits tenderness. He exhibits no edema.   Limited ROM left arm- HX of total left shoulder 9 years ago.  Pain tenderness right knee.   Neurological: He is alert and oriented to person, place, and time.   Skin: Skin is warm and dry.   Bruising left arm.-fading  Faded bruise posterior  right shoulder   Psychiatric: He has a normal mood and affect. His behavior is normal.         LABS:   5/24  HGB 12.6  Mag 5/12/17  1.7  5/6/17  Magnesium-1.6 replaced x 5 days.  K-4.1      ASSESSMENT:      ICD-10-CM    1. Type 2 diabetes mellitus without complication, without long-term current use of insulin E11.9    2. Primary osteoarthritis involving multiple joints M15.0    3. Chronic kidney disease, stage 3 (moderate) N18.3    4. Paroxysmal atrial fibrillation I48.0    5. JÚNIOR (acute kidney injury) N17.9    6. Essential hypertension with goal blood pressure less than 140/90 I10        PLAN:    Type 2 diabetes- continue Metformin and fingersticks.  Hypertension- stable continue sotalol  Diarrhea- loperamide PRN- resolved at this time  Atrial fibrillation- on Xarelto  Continue to optimize therapy  Pain management- schedule tramadol prior to therapy and then prn  Low magnesium- start magnesium oxide daily  Constipation-Miralax daily and Senna s prn            Electronically signed by: Mary Jo Ferguson CNP

## 2021-06-11 NOTE — PROGRESS NOTES
[facility]    DATE: 2017  NAME: Tre Van  : 8/10/1932           MR# [id number]     CODE STATUS:  DNR    VISIT TYPE: Discharge   FACILITY: [facility] [[id number]]        ROOM: 325  PRIMARY CARE PROVIDER: Deric Fermin MD Phone: [phone] Fax:[phone]     History Course:  Tre Van is a 84 y.o. male with A recent bout of deconditioning secondary to gastroenteritis which has essentially resolved. He remains somewhat difficult to move because of osteoarthritis in his right knee which he has decided he may have repaired if orthopedics will agree. He is ready for discharge today and I will expect his primary to follow up on the magnesium level to see whether he can continue to need the magnesium oxide supplement our last number indicated insufficient replacement at that time. His son will pick him up today and he's quite enthused about that. We did discuss knee replacement at some length and as would be expected I assured him that consultation with orthopedics would result in much better answers to his questions    Past Medical History:  Past Medical History:   Diagnosis Date     Cardiac pacemaker in situ      Chronic kidney disease      Diabetes mellitus     type II     Hyperlipidemia      Hypertension      Paroxysmal atrial fibrillation      Primary osteoarthritis involving multiple joints      Shingles 2002       Allergies:  No Known Allergies    Discharge Medications:  Current Outpatient Prescriptions   Medication Sig     acetaminophen (TYLENOL) 500 MG tablet Take 1,000 mg by mouth 3 (three) times a day as needed for pain.     aspirin 81 mg chewable tablet Chew 1 tablet (81 mg total) daily.     escitalopram oxalate (LEXAPRO) 10 MG tablet Take 1 tablet (10 mg total) by mouth daily.     fluticasone (FLONASE) 50 mcg/actuation nasal spray 1 spray into each nostril daily as needed for rhinitis.     loperamide (IMODIUM) 2 mg capsule Take 1 capsule  (2 mg total) by mouth 3 (three) times a day as needed for diarrhea.     magnesium oxide (MAG-OX) 400 mg tablet Take 400 mg by mouth daily.     metFORMIN (GLUCOPHAGE) 500 MG tablet Take 0.5 tablets (250 mg total) by mouth 2 (two) times a day with meals.     mometasone (ELOCON) 0.1 % cream Apply 1 application topically daily.     rivaroxaban 20 mg Tab Take 1 tablet (20 mg total) by mouth daily with supper.     senna-docusate (SENNOSIDES-DOCUSATE SODIUM) 8.6-50 mg tablet Take 2 tablets by mouth 2 (two) times a day as needed for constipation.     simvastatin (ZOCOR) 40 MG tablet Take 1 tablet (40 mg total) by mouth at bedtime.     sotalol (BETAPACE) 80 MG tablet Take 1 tablet (80 mg total) by mouth 2 (two) times a day.     traMADol (ULTRAM) 50 mg tablet Take 25 mg by mouth daily. And 25 mg every 6 hours prn       Review of Systems:  History obtained from chart review and the patient  Respiratory ROS: no cough, shortness of breath, or wheezing  Cardiovascular ROS: no chest pain or dyspnea on exertion  Gastrointestinal ROS: no abdominal pain, change in bowel habits, or black or bloody stools  Genito-Urinary ROS: no dysuria, trouble voiding, or hematuria  Musculoskeletal ROS: Especially right knee is sore on motion and especially in the morning with some swelling  Neurological ROS: no TIA or stroke symptoms  Dermatological ROS: he denies open skin lesions       Physical Examination:  /60  Pulse 77  Temp (!) 96.1  F (35.6  C)  Resp 18  Wt (!) 231 lb (104.8 kg)  SpO2 99%  BMI 28.87 kg/m2  General appearance: alert, appears stated age, cooperative, no distress and mildly obese  Neck: no adenopathy, no carotid bruit, no JVD, supple, symmetrical, trachea midline and thyroid not enlarged, symmetric, no tenderness/mass/nodules  Lungs: clear to auscultation bilaterally  Heart: regular rate and rhythm, S1, S2 normal, no murmur, click, rub or gallop  Abdomen: soft, non-tender; bowel sounds normal; no masses,  no  organomegaly  Extremities: some swelling and tenderness of his right knee although full range of motion  Pulses: 2+ and symmetric  Skin: Skin color, texture, turgor normal. No rashes or lesions  Neurologic: Grossly normal       Laboratory:None          Discharge Diagnosis:  Tre Van is a 84 y.o. male with Complete recovery from deconditioning from gastroenteritis.    1. Diarrhea with dehydration     2. Dizziness     3. Essential hypertension with goal blood pressure less than 140/90     4. Paroxysmal atrial fibrillation     5. Mobitz type II atrioventricular block     6. Cardiac pacemaker in situ     7. Chronic kidney disease, stage 3 (moderate)     8. Primary osteoarthritis involving multiple joints     9. Hyperlipidemia, unspecified hyperlipidemia type     10. Type 2 diabetes mellitus without complication, without long-term current use of insulin     11. Hypomagnesemia         Discharge Plan: Discharge home with his son I would expect them to do well and tentatively would consider him a surgical candidate for knee replacement but certainly would defer to orthopedic opinion    Electronically signed by: Josh Mancia Sr., MD

## 2021-06-12 NOTE — SEDATION DOCUMENTATION
Dominique with [employer] here to reprogram patients pacemaker; patient tolerated procedure well. Pacemaker documents scanned into patients chart.

## 2021-06-15 PROBLEM — E83.42 HYPOMAGNESEMIA: Status: ACTIVE | Noted: 2017-05-01

## 2021-06-15 PROBLEM — R19.7 DIARRHEA WITH DEHYDRATION: Status: ACTIVE | Noted: 2017-05-02

## 2021-06-15 PROBLEM — N17.9 AKI (ACUTE KIDNEY INJURY) (H): Status: ACTIVE | Noted: 2017-05-01

## 2021-06-16 PROBLEM — R19.7 DIARRHEA, UNSPECIFIED TYPE: Status: ACTIVE | Noted: 2018-07-20

## 2021-06-16 PROBLEM — S02.2XXA FRACTURE OF NASAL BONES: Status: ACTIVE | Noted: 2017-12-09
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
## 2021-06-29 NOTE — PROGRESS NOTES
Progress Notes by Viry Saldaña RN at 9/23/2020  4:08 PM     Author: Viry Saldaña RN Service: -- Author Type: Registered Nurse    Filed: 11/9/2020  2:16 PM Encounter Date: 9/23/2020 Status: Signed    : Viry Saldaña RN (Registered Nurse)       Per Care Everywhere:   PACEMAKER EVALUATION REPORT     July 15, 2020      : [employer]:  Model: Assurity MRI 2272  Serial Number [id number]  Implant Date: 3/19/2019  Atrial Lead : [employer]: Model: Tendril 2088TC/52 cm     Serial Number [id number]  Implant Date:  10/2/2012  Ventricular Lead :  [employer]:  Model:Tendril 2088TC/58 cm  Serial Number [id number]  Implant Date:  10/2/2012          System is 1.5T compatible only  Viry Saldaña RN

## 2021-08-19 ENCOUNTER — LAB REQUISITION (OUTPATIENT)
Dept: LAB | Facility: CLINIC | Age: 86
End: 2021-08-19
Payer: MEDICARE

## 2021-08-19 DIAGNOSIS — J02.9 ACUTE PHARYNGITIS, UNSPECIFIED: ICD-10-CM

## 2021-08-19 PROCEDURE — 87070 CULTURE OTHR SPECIMN AEROBIC: CPT | Mod: ORL | Performed by: NURSE PRACTITIONER

## 2021-08-19 PROCEDURE — U0005 INFEC AGEN DETEC AMPLI PROBE: HCPCS | Mod: ORL | Performed by: NURSE PRACTITIONER

## 2021-08-20 LAB — SARS-COV-2 RNA RESP QL NAA+PROBE: NEGATIVE

## 2021-08-22 LAB — BACTERIA SPEC CULT: NORMAL

## 2021-08-30 ENCOUNTER — HOSPITAL ENCOUNTER (EMERGENCY)
Facility: HOSPITAL | Age: 86
Discharge: HOME | End: 2021-08-30
Attending: EMERGENCY MEDICINE | Admitting: EMERGENCY MEDICINE
Payer: MEDICARE

## 2021-08-30 VITALS
OXYGEN SATURATION: 94 % | DIASTOLIC BLOOD PRESSURE: 65 MMHG | TEMPERATURE: 97.5 F | HEIGHT: 75 IN | RESPIRATION RATE: 20 BRPM | WEIGHT: 230 LBS | HEART RATE: 65 BPM | SYSTOLIC BLOOD PRESSURE: 107 MMHG | BODY MASS INDEX: 28.6 KG/M2

## 2021-08-30 DIAGNOSIS — K06.8 GINGIVAL BLEEDING: ICD-10-CM

## 2021-08-30 LAB
ANION GAP SERPL CALCULATED.3IONS-SCNC: 8 MMOL/L (ref 5–18)
BASOPHILS # BLD AUTO: 0 10E3/UL (ref 0–0.2)
BASOPHILS NFR BLD AUTO: 0 %
BUN SERPL-MCNC: 16 MG/DL (ref 8–28)
CALCIUM SERPL-MCNC: 8.9 MG/DL (ref 8.5–10.5)
CHLORIDE BLD-SCNC: 110 MMOL/L (ref 98–107)
CO2 SERPL-SCNC: 24 MMOL/L (ref 22–31)
CREAT SERPL-MCNC: 1.29 MG/DL (ref 0.7–1.3)
EOSINOPHIL # BLD AUTO: 0.3 10E3/UL (ref 0–0.7)
EOSINOPHIL NFR BLD AUTO: 5 %
ERYTHROCYTE [DISTWIDTH] IN BLOOD BY AUTOMATED COUNT: 12.7 % (ref 10–15)
GFR SERPL CREATININE-BSD FRML MDRD: 49 ML/MIN/1.73M2
GLUCOSE BLD-MCNC: 123 MG/DL (ref 70–125)
HCT VFR BLD AUTO: 43.1 % (ref 40–53)
HGB BLD-MCNC: 13.9 G/DL (ref 13.3–17.7)
IMM GRANULOCYTES # BLD: 0 10E3/UL
IMM GRANULOCYTES NFR BLD: 0 %
INR PPP: 1.87 (ref 0.85–1.15)
LYMPHOCYTES # BLD AUTO: 1.4 10E3/UL (ref 0.8–5.3)
LYMPHOCYTES NFR BLD AUTO: 25 %
MCH RBC QN AUTO: 30.2 PG (ref 26.5–33)
MCHC RBC AUTO-ENTMCNC: 32.3 G/DL (ref 31.5–36.5)
MCV RBC AUTO: 94 FL (ref 78–100)
MONOCYTES # BLD AUTO: 0.5 10E3/UL (ref 0–1.3)
MONOCYTES NFR BLD AUTO: 8 %
NEUTROPHILS # BLD AUTO: 3.5 10E3/UL (ref 1.6–8.3)
NEUTROPHILS NFR BLD AUTO: 62 %
NRBC # BLD AUTO: 0 10E3/UL
NRBC BLD AUTO-RTO: 0 /100
PLATELET # BLD AUTO: 183 10E3/UL (ref 150–450)
POTASSIUM BLD-SCNC: 4.1 MMOL/L (ref 3.5–5)
RBC # BLD AUTO: 4.61 10E6/UL (ref 4.4–5.9)
SODIUM SERPL-SCNC: 142 MMOL/L (ref 136–145)
WBC # BLD AUTO: 5.7 10E3/UL (ref 4–11)

## 2021-08-30 PROCEDURE — 36415 COLL VENOUS BLD VENIPUNCTURE: CPT | Performed by: EMERGENCY MEDICINE

## 2021-08-30 PROCEDURE — 85610 PROTHROMBIN TIME: CPT | Performed by: EMERGENCY MEDICINE

## 2021-08-30 PROCEDURE — 80048 BASIC METABOLIC PNL TOTAL CA: CPT | Performed by: EMERGENCY MEDICINE

## 2021-08-30 PROCEDURE — 85025 COMPLETE CBC W/AUTO DIFF WBC: CPT | Performed by: EMERGENCY MEDICINE

## 2021-08-30 PROCEDURE — 99283 EMERGENCY DEPT VISIT LOW MDM: CPT

## 2021-08-30 RX ORDER — PENICILLIN V POTASSIUM 500 MG/1
500 TABLET, FILM COATED ORAL 4 TIMES DAILY
Qty: 40 TABLET | Refills: 0 | Status: SHIPPED | OUTPATIENT
Start: 2021-08-30 | End: 2021-09-09

## 2021-08-30 ASSESSMENT — MIFFLIN-ST. JEOR: SCORE: 1793.9

## 2021-08-30 ASSESSMENT — ENCOUNTER SYMPTOMS
ABDOMINAL PAIN: 1
COUGH: 0
VOMITING: 0
BLOOD IN STOOL: 0
FEVER: 0
CHILLS: 0
SHORTNESS OF BREATH: 0
SORE THROAT: 1

## 2021-08-30 NOTE — ED PROVIDER NOTES
EMERGENCY DEPARTMENT ENCOUNTER      NAME: Tre Van  AGE: 89 year old male  YOB: 1932  MRN: [id number]  EVALUATION DATE & TIME: No admission date for patient encounter.    PCP: Kayli Che    ED PROVIDER: Ankur Bajwa D.O.      No chief complaint on file.        FINAL IMPRESSION:  1. Gingival bleeding          ED COURSE & MEDICAL DECISION MAKING:    Pertinent Labs & Imaging studies reviewed. (See chart for details)  89 year old male presents to the Emergency Department for evaluation of bleeding from his mouth.  Patient states over the last 3 days that he is noticed blood on his pillow when he wakes up.  He states that it only happens in the morning when he wakes up and does not occur throughout the day.  Patient is on Xarelto states that he takes it at night.  On examination he has no abnormalities to his posterior pharynx, tonsils, midline uvula.  There is an obvious bleeding gum around one of his teeth with some gingival irritation in his left lower jaw.  This likely cause of the bleeding.  No active bleeding on evaluation with good formation of a clot.  Do not feel that intervention is necessary to make bleeding stop it is no longer bleeding, I do discussed with him using teabags as far as slowing down bleeding if it happens at home.  Will check labs and start on antibiotics for gingival bleeding.    7:15 AM I met with the patient to gather history and to perform my initial exam. I discussed the plan for care while in the Emergency Department. PPE (protective eyewear, gloves, surgical cap, and N95 mask) was worn by me during patient encounters while patient wore mask.   8:35 AM I rechecked on the patient and updated him on lab results. No active bleeding from tooth. We discussed the plan for discharge and the patient is agreeable. Reviewed supportive cares, symptomatic treatment, outpatient follow up, and reasons to return to the Emergency Department. Patient to be discharged by  ED RN.  Patient's blood work reassuring, normal platelets, blood counts.  No active bleeding.  Will discharge home with antibiotic for infection and referral to dental clinics.  Patient agreeable to plan.      At the conclusion of the encounter I discussed the results of all of the tests and the disposition. The questions were answered. The patient or family acknowledged understanding and was agreeable with the care plan.       0 minutes of critical care time     MEDICATIONS GIVEN IN THE EMERGENCY:  Medications - No data to display    NEW PRESCRIPTIONS STARTED AT TODAY'S ER VISIT  New Prescriptions    PENICILLIN V (VEETID) 500 MG TABLET    Take 1 tablet (500 mg) by mouth 4 times daily for 10 days          =================================================================    HPI    Patient information was obtained from: Patient    Use of : N/A      Tre Van is a 89 year old male with a pertinent history of type 2 diabetes, chronic kidney disease, essential hypertension, hyperlipidemia, atrial fibrillation (on Xarelto), and cardiac pacemaker in situ who presents to this ED via walk-in for evaluation of mouth bleeding.    Per chart review,  Patient tested negative  for COVID-19 8/19/21 (11 days ago). He also had respiratory aerobic bacetrial culture which showed normal heather.     Patient reports his mouth has been bleeding for the past 3 days. He states he only notices the bleeding when he wakes up in the morning. Patient reports this morning, he woke up with blood on his pillow which is what prompted him to come to the ED. Patient additionally reports intermittent, mild abdominal pain and also endorses a sore throat, predominantly right sided. The patient states he was seen by his primary care provider ~2 weeks ago for a sore throat and was prescribed omeprazole and famotidine. Of note, the patient is on Xarelto for atrial fibrillation which he takes at night.     Patient denies any fevers, chills,  cough, congestion, shortness of breath, chest pain, hemoptysis, hematemesis, bloody stool, dental pain, or other symptoms or concerns at this time.     Social History: Patient is a former smoker and states he quit in 1967.    REVIEW OF SYSTEMS   Review of Systems   Constitutional: Negative for chills and fever.   HENT: Positive for sore throat. Negative for congestion and dental problem.         Endorses mouth bleeding.   Respiratory: Negative for cough (no hemoptysis) and shortness of breath.    Cardiovascular: Negative for chest pain.   Gastrointestinal: Positive for abdominal pain (mild, intermittent). Negative for blood in stool and vomiting (no hematemesis).   All other systems reviewed and are negative.      PAST MEDICAL HISTORY:  No past medical history on file.    PAST SURGICAL HISTORY:  Past Surgical History:   Procedure Laterality Date     EYE SURGERY Bilateral     cataracts     HERNIA REPAIR Left     inguinal     IMPLANT PACEMAKER Left 10/12/2012     JOINT REPLACEMENT Left 2006    shoulder     OTHER SURGICAL HISTORY Bilateral 2016    gel injections to both knees     [place] SIGMOIDOSCOPY FLX CONTROL BLEEDING N/A 5/3/2017    Procedure: SIGMOIDOSCOPY with biopsy ;  Surgeon: Maddison Taylor MD;  Location: [facility];  Service: Gastroenterology     RELEASE CARPAL TUNNEL Left      TONSILLECTOMY             CURRENT MEDICATIONS:    No current facility-administered medications for this encounter.     Current Outpatient Medications   Medication     penicillin V (VEETID) 500 MG tablet     acetaminophen (TYLENOL) 500 MG tablet     aspirin 81 mg chewable tablet     fluticasone (FLONASE) 50 mcg/actuation nasal spray     metFORMIN (GLUCOPHAGE) 500 MG tablet     rivaroxaban 20 mg Tab     simvastatin (ZOCOR) 40 MG tablet     sotalol (BETAPACE) 80 MG tablet     vit A/vit C/vit E/zinc/copper (PRESERVISION AREDS ORAL)         ALLERGIES:  Allergies   Allergen Reactions     Grapefruit [Grapefruit Extract] Unknown      Reaction: eye redness, bleeding gums       FAMILY HISTORY:  Family History   Problem Relation Age of Onset     No Known Problems Mother         Bled to death age 87     Heart Disease Father 56.00     Colitis Son        SOCIAL HISTORY:   Social History     Socioeconomic History     Marital status:      Spouse name: Not on file     Number of children: Not on file     Years of education: Not on file     Highest education level: Not on file   Occupational History     Not on file   Tobacco Use     Smoking status: Former Smoker     Packs/day: 1.50     Years: 23.00     Pack years: 34.50     Quit date: 1967     Years since quittin.6     Smokeless tobacco: Never Used   Substance and Sexual Activity     Alcohol use: Yes     Comment: Alcoholic Drinks/day: Small amounts of alcohol on special occasions     Drug use: No     Sexual activity: Not on file   Other Topics Concern     Not on file   Social History Narrative    Lives alone. In 2 story house.  2017     Social Determinants of Health     Financial Resource Strain:      Difficulty of Paying Living Expenses:    Food Insecurity:      Worried About Running Out of Food in the Last Year:      Ran Out of Food in the Last Year:    Transportation Needs:      Lack of Transportation (Medical):      Lack of Transportation (Non-Medical):    Physical Activity:      Days of Exercise per Week:      Minutes of Exercise per Session:    Stress:      Feeling of Stress :    Social Connections:      Frequency of Communication with Friends and Family:      Frequency of Social Gatherings with Friends and Family:      Attends Tenriism Services:      Active Member of Clubs or Organizations:      Attends Club or Organization Meetings:      Marital Status:    Intimate Partner Violence:      Fear of Current or Ex-Partner:      Emotionally Abused:      Physically Abused:      Sexually Abused:        VITALS:  /65   Pulse 65   Temp 97.5  F (36.4  C) (Tympanic)   Resp 20   Ht  "1.905 m (6' 3\")   Wt 104.3 kg (230 lb)   SpO2 94%   BMI 28.75 kg/m      PHYSICAL EXAM    Physical Exam  Vitals and nursing note reviewed.   Constitutional:       General: He is not in acute distress.     Appearance: Normal appearance. He is not ill-appearing.   HENT:      Head: Normocephalic and atraumatic.      Right Ear: External ear normal.      Left Ear: External ear normal.      Nose: Nose normal.      Mouth/Throat:      Mouth: Mucous membranes are moist.      Pharynx: Oropharynx is clear. Uvula midline.      Comments: Multiple missing teeth. Left lower tooth #28 with erythema and dried blood on root of tooth. No active bleeding. Posterior pharynx normal. No uvular deviation or active bleeding.  Eyes:      Extraocular Movements: Extraocular movements intact.      Pupils: Pupils are equal, round, and reactive to light.   Cardiovascular:      Rate and Rhythm: Normal rate and regular rhythm.   Pulmonary:      Effort: Pulmonary effort is normal.      Breath sounds: Normal breath sounds.   Abdominal:      General: Abdomen is flat. There is no distension.      Palpations: There is no mass.      Tenderness: There is no abdominal tenderness. There is no guarding or rebound.      Hernia: No hernia is present.   Musculoskeletal:         General: No swelling, tenderness or signs of injury. Normal range of motion.      Cervical back: Normal range of motion.   Skin:     General: Skin is warm.      Capillary Refill: Capillary refill takes less than 2 seconds.   Neurological:      General: No focal deficit present.      Mental Status: He is alert and oriented to person, place, and time. Mental status is at baseline.      Cranial Nerves: No cranial nerve deficit.      Motor: No weakness.      Coordination: Coordination normal.   Psychiatric:         Mood and Affect: Mood normal.          LAB:  All pertinent labs reviewed and interpreted.  Labs Ordered and Resulted from Time of ED Arrival Up to the Time of Departure from the " ED   INR - Abnormal; Notable for the following components:       Result Value    INR 1.87 (*)     All other components within normal limits   BASIC METABOLIC PANEL - Abnormal; Notable for the following components:    Chloride 110 (*)     GFR Estimate 49 (*)     All other components within normal limits   CBC WITH PLATELETS & DIFFERENTIAL    Narrative:     The following orders were created for panel order CBC with platelets differential.  Procedure                               Abnormality         Status                     ---------                               -----------         ------                     CBC with platelets and d...[[id number]]                      Final result                 Please view results for these tests on the individual orders.   CBC WITH PLATELETS AND DIFFERENTIAL       RADIOLOGY:  Reviewed all pertinent imaging. Please see official radiology report.  No orders to display       I, Ewa Winston, am serving as a scribe to document services personally performed by Dr. Ankur Bajwa based on my observation and the provider's statements to me. IAnkur, DO attest that Ewa Winston is acting in a scribe capacity, has observed my performance of the services and has documented them in accordance with my direction.    Ankur Bajwa D.O.  Emergency Medicine  [facility] EMERGENCY DEPARTMENT  [address] [place] [id number]  [phone]  Dept: [phone]       Ankur Bajwa DO  08/30/21 0894

## 2021-08-30 NOTE — ED TRIAGE NOTES
States his throat is bleeding, reports it started a couple of days ago. Was seen at his PCP for pharyngitis 1 week ago. Reports spiting up bright red blood, denies vomiting. .

## 2021-10-08 ENCOUNTER — HOSPITAL ENCOUNTER (OUTPATIENT)
Dept: CT IMAGING | Facility: HOSPITAL | Age: 86
Discharge: HOME | End: 2021-10-08
Attending: NURSE PRACTITIONER | Admitting: NURSE PRACTITIONER
Payer: MEDICARE

## 2021-10-08 DIAGNOSIS — R07.0 THROAT PAIN IN ADULT: ICD-10-CM

## 2021-10-08 LAB
CREAT BLD-MCNC: 1.5 MG/DL (ref 0.7–1.3)
GFR SERPL CREATININE-BSD FRML MDRD: 41 ML/MIN/1.73M2

## 2021-10-08 PROCEDURE — 70491 CT SOFT TISSUE NECK W/DYE: CPT

## 2021-10-08 PROCEDURE — 82565 ASSAY OF CREATININE: CPT

## 2021-10-08 PROCEDURE — 250N000011 HC RX IP 250 OP 636: Performed by: NURSE PRACTITIONER

## 2021-10-08 RX ORDER — IOPAMIDOL 755 MG/ML
75 INJECTION, SOLUTION INTRAVASCULAR ONCE
Status: COMPLETED | OUTPATIENT
Start: 2021-10-08 | End: 2021-10-08

## 2021-10-08 RX ADMIN — IOPAMIDOL 75 ML: 755 INJECTION, SOLUTION INTRAVENOUS at 13:10

## 2021-10-26 ENCOUNTER — LAB REQUISITION (OUTPATIENT)
Dept: LAB | Facility: CLINIC | Age: 86
End: 2021-10-26
Payer: MEDICARE

## 2021-10-26 DIAGNOSIS — R63.4 ABNORMAL WEIGHT LOSS: ICD-10-CM

## 2021-10-26 DIAGNOSIS — E55.9 VITAMIN D DEFICIENCY, UNSPECIFIED: ICD-10-CM

## 2021-10-26 LAB
ALBUMIN SERPL-MCNC: 3.5 G/DL (ref 3.5–5)
ALP SERPL-CCNC: 86 U/L (ref 45–120)
ALT SERPL W P-5'-P-CCNC: 14 U/L (ref 0–45)
ANION GAP SERPL CALCULATED.3IONS-SCNC: 14 MMOL/L (ref 5–18)
AST SERPL W P-5'-P-CCNC: 16 U/L (ref 0–40)
BILIRUB SERPL-MCNC: 0.8 MG/DL (ref 0–1)
BUN SERPL-MCNC: 18 MG/DL (ref 8–28)
CALCIUM SERPL-MCNC: 9.4 MG/DL (ref 8.5–10.5)
CHLORIDE BLD-SCNC: 104 MMOL/L (ref 98–107)
CO2 SERPL-SCNC: 23 MMOL/L (ref 22–31)
CREAT SERPL-MCNC: 1.34 MG/DL (ref 0.7–1.3)
ERYTHROCYTE [DISTWIDTH] IN BLOOD BY AUTOMATED COUNT: 13.2 % (ref 10–15)
GFR SERPL CREATININE-BSD FRML MDRD: 47 ML/MIN/1.73M2
GLUCOSE BLD-MCNC: 177 MG/DL (ref 70–125)
HCT VFR BLD AUTO: 44.5 % (ref 40–53)
HGB BLD-MCNC: 13.8 G/DL (ref 13.3–17.7)
MCH RBC QN AUTO: 29.8 PG (ref 26.5–33)
MCHC RBC AUTO-ENTMCNC: 31 G/DL (ref 31.5–36.5)
MCV RBC AUTO: 96 FL (ref 78–100)
PLATELET # BLD AUTO: 234 10E3/UL (ref 150–450)
POTASSIUM BLD-SCNC: 4.4 MMOL/L (ref 3.5–5)
PROT SERPL-MCNC: 6.3 G/DL (ref 6–8)
RBC # BLD AUTO: 4.63 10E6/UL (ref 4.4–5.9)
SODIUM SERPL-SCNC: 141 MMOL/L (ref 136–145)
T4 FREE SERPL-MCNC: 1.04 NG/DL (ref 0.7–1.8)
TSH SERPL DL<=0.005 MIU/L-ACNC: 0.94 UIU/ML (ref 0.3–5)
VIT B12 SERPL-MCNC: 152 PG/ML (ref 213–816)
WBC # BLD AUTO: 6.9 10E3/UL (ref 4–11)

## 2021-10-26 PROCEDURE — 80053 COMPREHEN METABOLIC PANEL: CPT | Mod: ORL | Performed by: NURSE PRACTITIONER

## 2021-10-26 PROCEDURE — 84439 ASSAY OF FREE THYROXINE: CPT | Mod: ORL | Performed by: NURSE PRACTITIONER

## 2021-10-26 PROCEDURE — 82306 VITAMIN D 25 HYDROXY: CPT | Mod: ORL | Performed by: NURSE PRACTITIONER

## 2021-10-26 PROCEDURE — 84443 ASSAY THYROID STIM HORMONE: CPT | Mod: ORL | Performed by: NURSE PRACTITIONER

## 2021-10-26 PROCEDURE — 82607 VITAMIN B-12: CPT | Mod: ORL | Performed by: NURSE PRACTITIONER

## 2021-10-26 PROCEDURE — 85027 COMPLETE CBC AUTOMATED: CPT | Mod: ORL | Performed by: NURSE PRACTITIONER

## 2021-10-27 LAB — DEPRECATED CALCIDIOL+CALCIFEROL SERPL-MC: 39 UG/L (ref 30–80)

## 2021-11-10 ENCOUNTER — APPOINTMENT (OUTPATIENT)
Dept: CT IMAGING | Facility: HOSPITAL | Age: 86
End: 2021-11-10
Attending: EMERGENCY MEDICINE
Payer: MEDICARE

## 2021-11-10 ENCOUNTER — HOSPITAL ENCOUNTER (OUTPATIENT)
Dept: RADIOLOGY | Facility: HOSPITAL | Age: 86
Discharge: HOME | End: 2021-11-10
Attending: OTOLARYNGOLOGY | Admitting: OTOLARYNGOLOGY
Payer: MEDICARE

## 2021-11-10 ENCOUNTER — HOSPITAL ENCOUNTER (EMERGENCY)
Facility: HOSPITAL | Age: 86
Discharge: HOME | End: 2021-11-10
Attending: EMERGENCY MEDICINE | Admitting: EMERGENCY MEDICINE
Payer: MEDICARE

## 2021-11-10 VITALS
HEART RATE: 64 BPM | RESPIRATION RATE: 20 BRPM | TEMPERATURE: 97.4 F | WEIGHT: 228 LBS | BODY MASS INDEX: 28.5 KG/M2 | OXYGEN SATURATION: 99 % | SYSTOLIC BLOOD PRESSURE: 128 MMHG | DIASTOLIC BLOOD PRESSURE: 73 MMHG

## 2021-11-10 DIAGNOSIS — R13.10 DYSPHAGIA, UNSPECIFIED TYPE: ICD-10-CM

## 2021-11-10 DIAGNOSIS — S02.92XA CLOSED FRACTURE OF FACIAL BONE, UNSPECIFIED FACIAL BONE, INITIAL ENCOUNTER (H): ICD-10-CM

## 2021-11-10 PROCEDURE — 250N000013 HC RX MED GY IP 250 OP 250 PS 637: Performed by: EMERGENCY MEDICINE

## 2021-11-10 PROCEDURE — 99284 EMERGENCY DEPT VISIT MOD MDM: CPT | Mod: 25

## 2021-11-10 PROCEDURE — 74220 X-RAY XM ESOPHAGUS 1CNTRST: CPT

## 2021-11-10 PROCEDURE — 72125 CT NECK SPINE W/O DYE: CPT

## 2021-11-10 PROCEDURE — 70450 CT HEAD/BRAIN W/O DYE: CPT

## 2021-11-10 PROCEDURE — 70486 CT MAXILLOFACIAL W/O DYE: CPT

## 2021-11-10 RX ORDER — ACETAMINOPHEN 325 MG/1
650 TABLET ORAL ONCE
Status: COMPLETED | OUTPATIENT
Start: 2021-11-10 | End: 2021-11-10

## 2021-11-10 RX ADMIN — ACETAMINOPHEN 650 MG: 325 TABLET ORAL at 19:20

## 2021-11-10 ASSESSMENT — ENCOUNTER SYMPTOMS
ARTHRALGIAS: 0
ABDOMINAL PAIN: 0
EYE REDNESS: 0
SHORTNESS OF BREATH: 0
NUMBNESS: 0
NECK STIFFNESS: 0
WEAKNESS: 0
COLOR CHANGE: 0
FACIAL SWELLING: 1
DIFFICULTY URINATING: 0
CONFUSION: 0
HEADACHES: 0
FEVER: 0
NECK PAIN: 1

## 2021-11-10 NOTE — ED NOTES
Bed: JNED-18  Expected date: 11/10/21  Expected time: 4:55 PM  Means of arrival: Ambulance  Comments:  SPF- 89 yo M fall on thinners head injury

## 2021-11-10 NOTE — ED PROVIDER NOTES
EMERGENCY DEPARTMENT ENCOUNTER     NAME: Tre Van   AGE: 89 year old male   YOB: 1932   MRN: [id number]   EVALUATION DATE & TIME: 11/10/2021  5:01 PM   PCP: Kayli Che     Chief Complaint   Patient presents with     Fall   :    FINAL IMPRESSION       1. Closed fracture of facial bone, unspecified facial bone, initial encounter (H)           ED COURSE & MEDICAL DECISION MAKING      Pertinent Labs & Imaging studies reviewed. (See chart for details)   89 year old male  presents to the Emergency Department for evaluation of a mechanical fall. Initial Vitals Reviewed. Initial exam notable for generally well-appearing male with a GCS of 15, mild posterior cervical tenderness, periorbital ecchymosis on the left with no entrapment.  He has no significant tenderness over the facial bones.  Fall was mechanical in nature, confirmed by patient.  He is anticoagulated so trauma alert was called on arrival.  I did a CT scan of his head, facial bones, cervical spine to assess for intracranial hemorrhage, C-spine fracture, facial fractures.  The only injury discovered was a fracture of the lamina papyracea.  I called and discussed the case with OMFS at the [facility] who reviewed the imaging.  At this time they do not feel the patient requires operative management or admission.  Patient is quite comfortable with this and his son is present at bedside.  The OMFS clinic will call tomorrow to schedule an outpatient follow-up appointment, and in the meantime sinus precautions were given, we discussed Tylenol for pain control and he was given some here, and he will be discharged to family's care in stable condition.        5:05 PM I met with the patient, obtained history, performed an initial exam, and discussed options and plan for diagnostics and treatment here in the ED.  6:35 PM I spoke to Dr. Stewart from [facility] Facial Trauma.  6:39 PM I updated patient.  6:50 PM Patient to be discharged by  RN.       At the conclusion of the encounter I discussed the results of all of the tests and the disposition. The questions were answered. The patient or family acknowledged understanding and was agreeable with the care plan.         MEDICATIONS GIVEN IN THE EMERGENCY:   Medications - No data to display   NEW PRESCRIPTIONS STARTED AT TODAY'S ER VISIT   New Prescriptions    No medications on file     ================================================================   HISTORY OF PRESENT ILLNESS       Patient information was obtained from: patient and EMS  Use of Intrepreter: N/A   Tre Van is a 89 year old male with history of anticoaguation who presents for evaluation of a fall.  Patient arrives via EMS.  He states that he was walking out of his bathroom, tripped on a rug and fell forward and hit his head and face.  He did not have any loss of consciousness.  He is complaining of neck pain, facial pain, and had a bloody nose which resolved after EMS put a clamp on it.  He denies any other injuries, chest pain, abdominal pain.  He was reportedly ambulatory at the scene.    ================================================================    REVIEW OF SYSTEMS       Review of Systems   Constitutional: Negative for fever.   HENT: Positive for facial swelling. Negative for congestion.    Eyes: Negative for redness.   Respiratory: Negative for shortness of breath.    Cardiovascular: Negative for chest pain.   Gastrointestinal: Negative for abdominal pain.   Genitourinary: Negative for difficulty urinating.   Musculoskeletal: Positive for neck pain. Negative for arthralgias and neck stiffness.   Skin: Negative for color change.   Neurological: Negative for syncope, weakness, numbness and headaches.   Psychiatric/Behavioral: Negative for confusion.       PAST HISTORY     PAST MEDICAL HISTORY:   No past medical history on file.   PAST SURGICAL HISTORY:   Past Surgical History:   Procedure Laterality Date     EYE  SURGERY Bilateral     cataracts     HERNIA REPAIR Left     inguinal     IMPLANT PACEMAKER Left 10/12/2012     JOINT REPLACEMENT Left 2006    shoulder     OTHER SURGICAL HISTORY Bilateral 2016    gel injections to both knees     [place] SIGMOIDOSCOPY FLX CONTROL BLEEDING N/A 5/3/2017    Procedure: SIGMOIDOSCOPY with biopsy ;  Surgeon: Maddison Taylor MD;  Location: [facility];  Service: Gastroenterology     RELEASE CARPAL TUNNEL Left      TONSILLECTOMY        CURRENT MEDICATIONS:   acetaminophen (TYLENOL) 500 MG tablet  aspirin 81 mg chewable tablet  fluticasone (FLONASE) 50 mcg/actuation nasal spray  metFORMIN (GLUCOPHAGE) 500 MG tablet  rivaroxaban 20 mg Tab  simvastatin (ZOCOR) 40 MG tablet  sotalol (BETAPACE) 80 MG tablet  vit A/vit C/vit E/zinc/copper (PRESERVISION AREDS ORAL)      ALLERGIES:   Allergies   Allergen Reactions     Grapefruit [Grapefruit Extract] Unknown     Reaction: eye redness, bleeding gums      FAMILY HISTORY:   Family History   Problem Relation Age of Onset     No Known Problems Mother         Bled to death age 87     Heart Disease Father 56.00     Colitis Son       SOCIAL HISTORY:   Social History     Socioeconomic History     Marital status:      Spouse name: Not on file     Number of children: Not on file     Years of education: Not on file     Highest education level: Not on file   Occupational History     Not on file   Tobacco Use     Smoking status: Former Smoker     Packs/day: 1.50     Years: 23.00     Pack years: 34.50     Quit date: 1967     Years since quittin.8     Smokeless tobacco: Never Used   Substance and Sexual Activity     Alcohol use: Yes     Comment: Alcoholic Drinks/day: Small amounts of alcohol on special occasions     Drug use: No     Sexual activity: Not on file   Other Topics Concern     Not on file   Social History Narrative    Lives alone. In [address] [facility].  2017     Social Determinants of Health     Financial Resource Strain: Not on file   Food  Insecurity: Not on file   Transportation Needs: Not on file   Physical Activity: Not on file   Stress: Not on file   Social Connections: Not on file   Intimate Partner Violence: Not on file   Housing Stability: Not on file        VITALS  No data found.     ================================================================    PHYSICAL EXAM     VITAL SIGNS: There were no vitals taken for this visit.   Constitutional:  Awake, no acute distress   HENT: Periorbital ecchymosis and swelling around the left eye.  Mild abrasion without laceration.  Dried blood in nose with no current epistaxis.  No septal hematoma, oropharynx without exudate or erythema, membranes moist  Lymph:  No adenopathy  Eyes: EOM intact, PERRL, no injection  Neck: Supple, tender to palpation posterior cervical spine with no step-off or deformity  Respiratory:  Clear to auscultation bilaterally, no wheezes or crackles   Cardiovascular:  Regular rate and rhythm, single S1 and S2   GI:  Soft, nontender, nondistended, no rebound or guarding   Musculoskeletal:  Moves all extremities, no lower extremity edema, no deformities    Skin:  Warm, dry  Neurologic:  Alert and oriented x3, no focal deficits noted. GCS 15      ================================================================  LAB       All pertinent labs reviewed and interpreted.   Labs Ordered and Resulted from Time of ED Arrival to Time of ED Departure - No data to display     ===============================================================  RADIOLOGY       Reviewed all pertinent imaging. Please see official radiology report.   CT Facial Bones without Contrast   Final Result   CONCLUSION:   HEAD CT:   1.  No acute intracranial hemorrhage, mass effect, or CT evidence for acute infarction.   2.  No acute calvarial fracture or scalp hematoma.   3.  Moderate global brain parenchymal volume loss with presumed sequelae of mild chronic small vessel ischemic disease.      FACIAL BONE AND MANDIBLE CT:   1.   Acute, medially displaced fracture of the right lamina papyracea with soft tissue swelling in the medial extraconal space adjacent to the medial rectus and superior oblique muscles extending into the fracture defect.   2.  Normal appearance of the globe and extraocular muscles.   3.  Moderate soft tissue swelling and hematoma in the left supraorbital and periorbital soft tissues.   4.  Mild scattered hemorrhagic fluid in the left maxillary sinus and ethmoid air cells.      CERVICAL SPINE CT:   1.  No evidence for acute fracture or posttraumatic subluxation of the cervical spine by CT imaging.   2.  At C5-C6, a posterior disc osteophyte complex produces severe spinal canal stenosis with flattening of the spinal cord contour.   3.  Severe degenerative neuroforaminal stenosis on the right at C5-C6 and bilaterally at C6-C7.      Cervical spine CT w/o contrast   Final Result   CONCLUSION:   HEAD CT:   1.  No acute intracranial hemorrhage, mass effect, or CT evidence for acute infarction.   2.  No acute calvarial fracture or scalp hematoma.   3.  Moderate global brain parenchymal volume loss with presumed sequelae of mild chronic small vessel ischemic disease.      FACIAL BONE AND MANDIBLE CT:   1.  Acute, medially displaced fracture of the right lamina papyracea with soft tissue swelling in the medial extraconal space adjacent to the medial rectus and superior oblique muscles extending into the fracture defect.   2.  Normal appearance of the globe and extraocular muscles.   3.  Moderate soft tissue swelling and hematoma in the left supraorbital and periorbital soft tissues.   4.  Mild scattered hemorrhagic fluid in the left maxillary sinus and ethmoid air cells.      CERVICAL SPINE CT:   1.  No evidence for acute fracture or posttraumatic subluxation of the cervical spine by CT imaging.   2.  At C5-C6, a posterior disc osteophyte complex produces severe spinal canal stenosis with flattening of the spinal cord contour.    3.  Severe degenerative neuroforaminal stenosis on the right at C5-C6 and bilaterally at C6-C7.      Head CT w/o contrast   Final Result   CONCLUSION:   HEAD CT:   1.  No acute intracranial hemorrhage, mass effect, or CT evidence for acute infarction.   2.  No acute calvarial fracture or scalp hematoma.   3.  Moderate global brain parenchymal volume loss with presumed sequelae of mild chronic small vessel ischemic disease.      FACIAL BONE AND MANDIBLE CT:   1.  Acute, medially displaced fracture of the right lamina papyracea with soft tissue swelling in the medial extraconal space adjacent to the medial rectus and superior oblique muscles extending into the fracture defect.   2.  Normal appearance of the globe and extraocular muscles.   3.  Moderate soft tissue swelling and hematoma in the left supraorbital and periorbital soft tissues.   4.  Mild scattered hemorrhagic fluid in the left maxillary sinus and ethmoid air cells.      CERVICAL SPINE CT:   1.  No evidence for acute fracture or posttraumatic subluxation of the cervical spine by CT imaging.   2.  At C5-C6, a posterior disc osteophyte complex produces severe spinal canal stenosis with flattening of the spinal cord contour.   3.  Severe degenerative neuroforaminal stenosis on the right at C5-C6 and bilaterally at C6-C7.            ================================================================  EKG         I have independently reviewed and interpreted the EKG(s) documented above.     ================================================================  PROCEDURES         I, Zayra Galaeno, am serving as a scribe to document services personally performed by Dr. Conner based on my observation and the provider's statements to me. I, Eloise Conner MD attest that Zayra Galeano is acting in a scribe capacity, has observed my performance of the services and has documented them in accordance with my direction.   Eloise Conner M.D.   Emergency Medicine   [facility]  [facility] EMERGENCY DEPARTMENT  [address] [place] [id number]  [phone]  Dept: [phone]        Eloise Conner MD  11/10/21 0579

## 2021-11-11 NOTE — PLAN OF CARE
Oral and [facility]illofacial Surgery Clinic - [facility]  7th floor of [facility] [address] [address]  Clinic phone number: [phone]  Clinic fax number: [phone]    Clinic will call patient for follow up visit. Visit will occur in about 1 week.

## 2021-11-11 NOTE — DISCHARGE INSTRUCTIONS
As we discussed, the CT scans show a fracture of the very small bone in the face that does not need surgery.  Avoid submerging your head, swimming, blowing your nose for about a week.  The clinic doctors should be calling you in the morning to schedule a follow-up appointment where they can evaluate for healing in clinic.

## 2022-03-29 ENCOUNTER — LAB REQUISITION (OUTPATIENT)
Dept: LAB | Facility: CLINIC | Age: 87
End: 2022-03-29
Payer: MEDICARE

## 2022-03-29 DIAGNOSIS — E53.8 DEFICIENCY OF OTHER SPECIFIED B GROUP VITAMINS: ICD-10-CM

## 2022-03-29 LAB — VIT B12 SERPL-MCNC: 243 PG/ML (ref 213–816)

## 2022-03-29 PROCEDURE — 82607 VITAMIN B-12: CPT | Mod: ORL | Performed by: NURSE PRACTITIONER

## 2022-04-26 ENCOUNTER — APPOINTMENT (OUTPATIENT)
Dept: CT IMAGING | Facility: HOSPITAL | Age: 87
End: 2022-04-26
Attending: EMERGENCY MEDICINE
Payer: MEDICARE

## 2022-04-26 ENCOUNTER — APPOINTMENT (OUTPATIENT)
Dept: RADIOLOGY | Facility: HOSPITAL | Age: 87
End: 2022-04-26
Attending: EMERGENCY MEDICINE
Payer: MEDICARE

## 2022-04-26 ENCOUNTER — HOSPITAL ENCOUNTER (EMERGENCY)
Facility: HOSPITAL | Age: 87
Discharge: HOME | End: 2022-04-26
Attending: EMERGENCY MEDICINE | Admitting: EMERGENCY MEDICINE
Payer: MEDICARE

## 2022-04-26 VITALS
SYSTOLIC BLOOD PRESSURE: 137 MMHG | WEIGHT: 228 LBS | OXYGEN SATURATION: 99 % | HEART RATE: 65 BPM | RESPIRATION RATE: 19 BRPM | BODY MASS INDEX: 28.5 KG/M2 | DIASTOLIC BLOOD PRESSURE: 80 MMHG | TEMPERATURE: 97.4 F

## 2022-04-26 DIAGNOSIS — S20.212A CHEST WALL CONTUSION, LEFT, INITIAL ENCOUNTER: ICD-10-CM
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
## 2022-04-26 DIAGNOSIS — S00.03XA HEMATOMA OF SCALP, INITIAL ENCOUNTER: ICD-10-CM
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
## 2022-04-26 LAB
ANION GAP SERPL CALCULATED.3IONS-SCNC: 10 MMOL/L (ref 5–18)
APTT PPP: 38 SECONDS (ref 22–38)
BASOPHILS # BLD AUTO: 0 10E3/UL (ref 0–0.2)
BASOPHILS NFR BLD AUTO: 0 %
BUN SERPL-MCNC: 22 MG/DL (ref 8–28)
CALCIUM SERPL-MCNC: 8.9 MG/DL (ref 8.5–10.5)
CHLORIDE BLD-SCNC: 107 MMOL/L (ref 98–107)
CO2 SERPL-SCNC: 24 MMOL/L (ref 22–31)
CREAT SERPL-MCNC: 1.31 MG/DL (ref 0.7–1.3)
EOSINOPHIL # BLD AUTO: 0.5 10E3/UL (ref 0–0.7)
EOSINOPHIL NFR BLD AUTO: 8 %
ERYTHROCYTE [DISTWIDTH] IN BLOOD BY AUTOMATED COUNT: 13.1 % (ref 10–15)
GFR SERPL CREATININE-BSD FRML MDRD: 52 ML/MIN/1.73M2
GLUCOSE BLD-MCNC: 116 MG/DL (ref 70–125)
HCT VFR BLD AUTO: 42.7 % (ref 40–53)
HGB BLD-MCNC: 13.7 G/DL (ref 13.3–17.7)
IMM GRANULOCYTES # BLD: 0 10E3/UL
IMM GRANULOCYTES NFR BLD: 1 %
INR PPP: 1.88 (ref 0.9–1.15)
LYMPHOCYTES # BLD AUTO: 1.1 10E3/UL (ref 0.8–5.3)
LYMPHOCYTES NFR BLD AUTO: 19 %
MCH RBC QN AUTO: 29.7 PG (ref 26.5–33)
MCHC RBC AUTO-ENTMCNC: 32.1 G/DL (ref 31.5–36.5)
MCV RBC AUTO: 92 FL (ref 78–100)
MONOCYTES # BLD AUTO: 0.5 10E3/UL (ref 0–1.3)
MONOCYTES NFR BLD AUTO: 8 %
NEUTROPHILS # BLD AUTO: 3.7 10E3/UL (ref 1.6–8.3)
NEUTROPHILS NFR BLD AUTO: 64 %
NRBC # BLD AUTO: 0 10E3/UL
NRBC BLD AUTO-RTO: 0 /100
PLATELET # BLD AUTO: 174 10E3/UL (ref 150–450)
POTASSIUM BLD-SCNC: 4.1 MMOL/L (ref 3.5–5)
RBC # BLD AUTO: 4.62 10E6/UL (ref 4.4–5.9)
SODIUM SERPL-SCNC: 141 MMOL/L (ref 136–145)
WBC # BLD AUTO: 5.8 10E3/UL (ref 4–11)

## 2022-04-26 PROCEDURE — 85610 PROTHROMBIN TIME: CPT | Performed by: EMERGENCY MEDICINE

## 2022-04-26 PROCEDURE — 93005 ELECTROCARDIOGRAM TRACING: CPT | Performed by: EMERGENCY MEDICINE

## 2022-04-26 PROCEDURE — 71101 X-RAY EXAM UNILAT RIBS/CHEST: CPT | Mod: LT

## 2022-04-26 PROCEDURE — 72125 CT NECK SPINE W/O DYE: CPT

## 2022-04-26 PROCEDURE — 85730 THROMBOPLASTIN TIME PARTIAL: CPT | Performed by: EMERGENCY MEDICINE

## 2022-04-26 PROCEDURE — 250N000013 HC RX MED GY IP 250 OP 250 PS 637: Performed by: EMERGENCY MEDICINE

## 2022-04-26 PROCEDURE — 80048 BASIC METABOLIC PNL TOTAL CA: CPT | Performed by: EMERGENCY MEDICINE

## 2022-04-26 PROCEDURE — 36415 COLL VENOUS BLD VENIPUNCTURE: CPT | Performed by: EMERGENCY MEDICINE

## 2022-04-26 PROCEDURE — 85025 COMPLETE CBC W/AUTO DIFF WBC: CPT | Performed by: EMERGENCY MEDICINE

## 2022-04-26 PROCEDURE — 70450 CT HEAD/BRAIN W/O DYE: CPT

## 2022-04-26 PROCEDURE — 99285 EMERGENCY DEPT VISIT HI MDM: CPT | Mod: 25

## 2022-04-26 RX ORDER — ACETAMINOPHEN 325 MG/1
650 TABLET ORAL ONCE
Status: COMPLETED | OUTPATIENT
Start: 2022-04-26 | End: 2022-04-26

## 2022-04-26 RX ADMIN — ACETAMINOPHEN 650 MG: 325 TABLET ORAL at 12:32

## 2022-04-26 ASSESSMENT — ENCOUNTER SYMPTOMS
COUGH: 0
LIGHT-HEADEDNESS: 0
SHORTNESS OF BREATH: 0
VOMITING: 0
DIZZINESS: 0
DIARRHEA: 0

## 2022-04-26 NOTE — ED TRIAGE NOTES
Triage Assessment     Row Name 04/26/22 0922       Triage Assessment (Adult)    Airway WDL WDL    Additional Documentation Breath Sounds (Group);Heart Sounds (Row);[place] Coma Scale (Group)       Respiratory WDL    Respiratory WDL WDL       Breath Sounds    Breath Sounds All Fields    All Lung Fields Breath Sounds equal bilaterally       Skin Circulation/Temperature WDL    Skin Circulation/Temperature WDL WDL       Cardiac WDL    Cardiac WDL X  Paced rhythm.       Peripheral/Neurovascular WDL    Peripheral Neurovascular WDL WDL       Cognitive/Neuro/Behavioral WDL    Cognitive/Neuro/Behavioral WDL WDL       Garth Coma Scale    Best Eye Response 4-->(E4) spontaneous    Best Motor Response 6-->(M6) obeys commands    Best Verbal Response 5-->(V5) oriented    Garth Coma Scale Score 15

## 2022-04-26 NOTE — ED NOTES
Bed: [facility]-04  Expected date: 4/26/22  Expected time:   Means of arrival: Ambulance  Comments:  Fall  89 M  fall

## 2022-04-26 NOTE — ED PROVIDER NOTES
EMERGENCY DEPARTMENT ENCOUNTER      NAME: Tre Van  AGE: 89 year old male  YOB: 1932  MRN: [id number]  EVALUATION DATE & TIME: 2022  9:09 AM    PCP: Kayli Che    ED PROVIDER: Ruth Alexander DO      Chief Complaint   Patient presents with     Head Injury     Patient presents here via [facility] for evaluation of a fall that occurred this morning while going out of his home to retrieve his paper. He tripped and fell, striking the back of his head. No LOC. He does take Zaralto for blood thinning. He is reporting left lateral chest wall pain. Low back and left knee pain are chronic.          FINAL IMPRESSION:  1. Fall in home, initial encounter    2. Hematoma of scalp, initial encounter    3. Chest wall contusion, left, initial encounter          ED COURSE & MEDICAL DECISION MAKIN-year-old male who is on rivaroxaban for atrial fibrillation was brought into the ED via EMS following a fall.  The patient states that he lost his balance while bending forward to  his newspaper.  The patient states that he has a history of losing his balance whenever he bends forward, this is why he was using a grasping apparatus to  the paper today.  Despite using this tool he still lost his balance while bending forward, however.  He denies experiencing any symptoms prior to the fall or in the last few days.  He states that he hit his head but he denies loss of consciousness.  Patient reported mild pain in his neck and posterior left ribs.  He had no other complaints of trauma or injury.  In the ED the patient was minimally hypertensive but he was otherwise hemodynamically stable.  He did not appear to be in any obvious distress or discomfort at the time of his initial evaluation.  On exam the patient was noted to have tenderness palpation over the bilateral neck musculature.  He did not have any C-spine tenderness palpation, however.  Patient also had mild tenderness palpation  noted over the left posterior ribs.  No other signs of trauma or injury noted and the remainder the physical exam was unremarkable.    An EKG was obtained which revealed a unchanged ventricularly paced rhythm.   CBC, BMP, PTT, and INR were all reassuring.      CT scan of the head and cervical spine did not show evidence of intracranial bleeding, fractures, dislocations, or other signs of acute traumatic injury.  The patient was noted to have high-grade foraminal stenosis within the cervical spine.  Chest x-ray did not show evidence of any fractures or other signs of acute traumatic injury.    Patient was reevaluated and informed of the lab and imaging studies.  The patient denies any chronic neck pain with the high-grade femoral stenosis that was noted on CT scan.  The patient was informed that he likely has a strain of his neck musculature as well as a scalp hematoma and chest wall contusion.  The patient did not appear to have any symptoms that were concerning for a concussion here in the ED.  After educating and reassure the patient he felt comfortable returning home.  The patient was able to ambulate here in the ED without any difficulty.   The patient stated that one of his sons can check on him or stay with him this evening after returning home.   The patient was instructed to return back to ED for any repeat falls, increasing dizziness, confusion, headaches, vomiting, or any other new or concerning symptoms.      Pertinent Labs & Imaging studies reviewed. (See chart for details)  9:44 AM I met with the patient to gather history and to perform my initial exam. We discussed plans for the ED course, including diagnostic testing and treatment.       At the conclusion of the encounter I discussed the results of all of the tests and the disposition. The questions were answered. The patient or family acknowledged understanding and was agreeable with the care plan.       PPE worn: n95 mask, goggles    MEDICATIONS  GIVEN IN THE EMERGENCY:  Medications - No data to display    NEW PRESCRIPTIONS STARTED AT TODAY'S ER VISIT  New Prescriptions    No medications on file          =================================================================    HPI    Patient information was obtained from: patient     Use of : N/A         Tre Van is a 89 year old male with a pertinent history of diabetes, A. fib, hypertension, and CKD who is on rivaroxaban who presents to this ED by EMS for evaluation of fall.     Patient has a history of losing his balance when he bends over to grab things, so he has a grabber mechanism that he uses at home. Today, he was on his front steps and was using his grabber mechanism to  the newspaper, but he bent too far and lost his balance. The patient fell backwards and hit his head. He has a small lump on the back of his head. Patient's only complaint here in the ED is pain to his left posterior ribs. He denies any other trauma or injury. He denies any medication changes. Patient lives by himself at home. He is on Xarelto. Denies syncope, dizziness, light headedness, chest pain, shortness of breath, cough, vomiting, diarrhea, or any other complaints at this time.     REVIEW OF SYSTEMS   Review of Systems   Respiratory: Negative for cough and shortness of breath.    Cardiovascular: Negative for chest pain.   Gastrointestinal: Negative for diarrhea and vomiting.   Musculoskeletal:        Positive for left posterior rib pain   Skin:        Positive for hematoma over occipital scalp   Neurological: Negative for dizziness, syncope and light-headedness.   All other systems reviewed and are negative.      PAST MEDICAL HISTORY:  History reviewed. No pertinent past medical history.    PAST SURGICAL HISTORY:  Past Surgical History:   Procedure Laterality Date     EYE SURGERY Bilateral     cataracts     HERNIA REPAIR Left     inguinal     IMPLANT PACEMAKER Left 10/12/2012     JOINT REPLACEMENT Left  2006    shoulder     OTHER SURGICAL HISTORY Bilateral 2016    gel injections to both knees     TX SIGMOIDOSCOPY FLX CONTROL BLEEDING N/A 5/3/2017    Procedure: SIGMOIDOSCOPY with biopsy ;  Surgeon: Maddison Taylor MD;  Location: [facility];  Service: Gastroenterology     RELEASE CARPAL TUNNEL Left      TONSILLECTOMY           CURRENT MEDICATIONS:    acetaminophen (TYLENOL) 500 MG tablet  aspirin 81 mg chewable tablet  fluticasone (FLONASE) 50 mcg/actuation nasal spray  metFORMIN (GLUCOPHAGE) 500 MG tablet  rivaroxaban 20 mg Tab  simvastatin (ZOCOR) 40 MG tablet  sotalol (BETAPACE) 80 MG tablet  vit A/vit C/vit E/zinc/copper (PRESERVISION AREDS ORAL)        ALLERGIES:  Allergies   Allergen Reactions     Grapefruit [Grapefruit Extract] Unknown     Reaction: eye redness, bleeding gums       FAMILY HISTORY:  Family History   Problem Relation Age of Onset     No Known Problems Mother         Bled to death age 87     Heart Disease Father 56.00     Colitis Son        SOCIAL HISTORY:   Social History     Socioeconomic History     Marital status:      Spouse name: None     Number of children: None     Years of education: None     Highest education level: None   Tobacco Use     Smoking status: Former Smoker     Packs/day: 1.50     Years: 23.00     Pack years: 34.50     Quit date: 1967     Years since quittin.3     Smokeless tobacco: Never Used   Substance and Sexual Activity     Alcohol use: Yes     Comment: Alcoholic Drinks/day: Small amounts of alcohol on special occasions     Drug use: No   Social History Narrative    Lives alone. In 2 [place] house.  2017       VITALS:  BP (!) 149/84   Pulse 65   Temp 97.4  F (36.3  C) (Oral)   Resp 19   Wt 103.4 kg (228 lb)   SpO2 99%   BMI 28.50 kg/m      PHYSICAL EXAM    General Presentation: No apparent distress.  ENT: Ears atraumatic. Ear canals clear. No blood or CSF in canals. No hemotympanum or tympanic membrane rupture. Nose atraumatic/non-tender. No  septal hematoma. Face/mandible non-tender. Oropharynx clear.  Eye: Pupils equal round and reactive to light. EOMFI. No conjunctival hemorrhage. No hyphema. Eye lids normal.  Pulmonary: Spontaneous respiration. No respiratory distress. Clear equal breath sounds. Airway patent. Speech is normal. No stridor. Grossly stable chest wall. No crepitus. No chest wall tenderness to palpation noted.  Circulatory: Regular rate and rhythm. No murmurs, rubs, or gallops. Normal capillary refill.   Abdominal: Abdomen soft, non-tender and non-distended. No peritoneal signs. No flank tenderness. Normal bowel sounds. Pelvis stable/non-tender.   Neurologic: Alert and awake. Cranial nerves II-XII grossly intact.  No gross motor deficit. No gross sensory deficit. Normal upper extremity and lower extremity strength. Garth Coma Score 15.  Spine: No bony tenderness to palpation in the cervical spine, thoracic spine, lumbar spine, or sacrum.     Musculoskeletal: Mild tenderness to palpation over left posterior lower ribs. Tenderness to palpation to bilateral lateral neck musculature. No other tenderness to palpation.   Upper extremities:  Full range of motion. No tenderness to palpation.  Pulses 2/4 bilateral upper extremities . No wounds, abrasions, lacerations, or contusions noted.   Lower extremities:  Full range of motion. No tenderness to palpation.  Pulses 2/4 bilateral lower extremities . No wounds, abrasions, lacerations, or contusions noted.   Skin: Small hematoma over the occipital scalp. No wounds, abrasions, lacerations, to the chest, back, abdomen, flank or pelvis.        LAB:  All pertinent labs reviewed and interpreted.  Results for orders placed or performed during the hospital encounter of 04/26/22   Head CT w/o contrast    Impression    IMPRESSION:  HEAD CT:  1.  Left parietal scalp superficial soft tissue contusion/subgaleal hematoma. Underlying calvarium is intact.    2.  No finding for intracranial hemorrhage, mass, or  acute infarct.    3.  Moderate volume loss and at least mild to moderate presumed sequela of chronic microvascular ischemic change.    CERVICAL SPINE CT:  1.  Advanced cervical spondylosis, similar to prior and without findings for acute fracture or posttraumatic subluxation. No prevertebral soft tissue swelling.    2.  Severe canal stenosis C5-C6 with more moderate canal stenosis at C4-C5. High-grade foraminal stenosis C3-C4 through C6-C7.   Cervical spine CT w/o contrast    Impression    IMPRESSION:  HEAD CT:  1.  Left parietal scalp superficial soft tissue contusion/subgaleal hematoma. Underlying calvarium is intact.    2.  No finding for intracranial hemorrhage, mass, or acute infarct.    3.  Moderate volume loss and at least mild to moderate presumed sequela of chronic microvascular ischemic change.    CERVICAL SPINE CT:  1.  Advanced cervical spondylosis, similar to prior and without findings for acute fracture or posttraumatic subluxation. No prevertebral soft tissue swelling.    2.  Severe canal stenosis C5-C6 with more moderate canal stenosis at C4-C5. High-grade foraminal stenosis C3-C4 through C6-C7.   Ribs XR, unilat 3 views + PA chest,  left    Impression    IMPRESSION: Left subclavian approach dual chamber pacemaker has right atrial appendage and right ventricular leads. Cardiac silhouette is accentuated by projection but normal in size. Crowding of the bronchovascular structures around the geovanna. Shallow   inflation. No airspace or interstitial opacities.    Normal bone mineralization. Sclerosis at the anterior costochondral junction of left anterior ribs 9 and 11 consistent with remote rib fractures. There is also a deformity at the left anterior ninth costochondral junction without the same degree of   sclerosis which is age indeterminate. No acute fractures are present in the region of the radiopaque marker.   Basic metabolic panel   Result Value Ref Range    Sodium 141 136 - 145 mmol/L    Potassium  4.1 3.5 - 5.0 mmol/L    Chloride 107 98 - 107 mmol/L    Carbon Dioxide (CO2) 24 22 - 31 mmol/L    Anion Gap 10 5 - 18 mmol/L    Urea Nitrogen 22 8 - 28 mg/dL    Creatinine 1.31 (H) 0.70 - 1.30 mg/dL    Calcium 8.9 8.5 - 10.5 mg/dL    Glucose 116 70 - 125 mg/dL    GFR Estimate 52 (L) >60 mL/min/1.73m2   Result Value Ref Range    INR 1.88 (H) 0.90 - 1.15   Result Value Ref Range    aPTT 38 22 - 38 Seconds   CBC with platelets and differential   Result Value Ref Range    WBC Count 5.8 4.0 - 11.0 10e3/uL    RBC Count 4.62 4.40 - 5.90 10e6/uL    Hemoglobin 13.7 13.3 - 17.7 g/dL    Hematocrit 42.7 40.0 - 53.0 %    MCV 92 78 - 100 fL    MCH 29.7 26.5 - 33.0 pg    MCHC 32.1 31.5 - 36.5 g/dL    RDW 13.1 10.0 - 15.0 %    Platelet Count 174 150 - 450 10e3/uL    % Neutrophils 64 %    % Lymphocytes 19 %    % Monocytes 8 %    % Eosinophils 8 %    % Basophils 0 %    % Immature Granulocytes 1 %    NRBCs per 100 WBC 0 <1 /100    Absolute Neutrophils 3.7 1.6 - 8.3 10e3/uL    Absolute Lymphocytes 1.1 0.8 - 5.3 10e3/uL    Absolute Monocytes 0.5 0.0 - 1.3 10e3/uL    Absolute Eosinophils 0.5 0.0 - 0.7 10e3/uL    Absolute Basophils 0.0 0.0 - 0.2 10e3/uL    Absolute Immature Granulocytes 0.0 <=0.4 10e3/uL    Absolute NRBCs 0.0 10e3/uL       RADIOLOGY:  Reviewed all pertinent imaging. Please see official radiology report.  Ribs XR, unilat 3 views + PA chest,  left   Final Result   IMPRESSION: Left subclavian approach dual chamber pacemaker has right atrial appendage and right ventricular leads. Cardiac silhouette is accentuated by projection but normal in size. Crowding of the bronchovascular structures around the geovanna. Shallow    inflation. No airspace or interstitial opacities.      Normal bone mineralization. Sclerosis at the anterior costochondral junction of left anterior ribs 9 and 11 consistent with remote rib fractures. There is also a deformity at the left anterior ninth costochondral junction without the same degree of     sclerosis which is age indeterminate. No acute fractures are present in the region of the radiopaque marker.      Cervical spine CT w/o contrast   Final Result   IMPRESSION:   HEAD CT:   1.  Left parietal scalp superficial soft tissue contusion/subgaleal hematoma. Underlying calvarium is intact.      2.  No finding for intracranial hemorrhage, mass, or acute infarct.      3.  Moderate volume loss and at least mild to moderate presumed sequela of chronic microvascular ischemic change.      CERVICAL SPINE CT:   1.  Advanced cervical spondylosis, similar to prior and without findings for acute fracture or posttraumatic subluxation. No prevertebral soft tissue swelling.      2.  Severe canal stenosis C5-C6 with more moderate canal stenosis at C4-C5. High-grade foraminal stenosis C3-C4 through C6-C7.      Head CT w/o contrast   Final Result   IMPRESSION:   HEAD CT:   1.  Left parietal scalp superficial soft tissue contusion/subgaleal hematoma. Underlying calvarium is intact.      2.  No finding for intracranial hemorrhage, mass, or acute infarct.      3.  Moderate volume loss and at least mild to moderate presumed sequela of chronic microvascular ischemic change.      CERVICAL SPINE CT:   1.  Advanced cervical spondylosis, similar to prior and without findings for acute fracture or posttraumatic subluxation. No prevertebral soft tissue swelling.      2.  Severe canal stenosis C5-C6 with more moderate canal stenosis at C4-C5. High-grade foraminal stenosis C3-C4 through C6-C7.          EKG:    Ventricularly paced rhythm.  Rate of 65.  Compared EKG on 4/10/2019 no significant changes noted.    I have independently reviewed and interpreted the EKG(s) documented above.      I, Jakub Zacarias , am serving as a scribe to document services personally performed by Ruth Alexander, DO based on my observation and the provider's statements to me. I, Ruth Alexander, attest that Jakub Zacarias is acting in a scribe capacity, has observed  my performance of the services and has documented them in accordance with my direction.    Ruth Alexander DO  Emergency Medicine  [facility] EMERGENCY DEPARTMENT  [facility] [facility] [id number] [phone]     Ruth Alexander DO  04/26/22 3314

## 2022-04-26 NOTE — DISCHARGE INSTRUCTIONS
CT scan of the head and cervical spine as well as x-rays of the ribs did not show any acute traumatic injuries.  Your EKG and laboratory tests were also reassuring.  Continue taking your home medications as directed.  Follow-up with your primary care physician for reevaluation.  Return back to ED sooner for any worsening headaches, confusion, dizziness, or any other new or concerning symptoms

## 2022-04-26 NOTE — ED NOTES
Nursing Update: Patient returned from radiology at this time. He notes no changes since initial encounter. He is alert and interacting appropriately. Patient updated on timeline to receive results of radiology imaging done.

## 2022-04-26 NOTE — Clinical Note
Discharged  home.    Pt discharged with all belongings, pt is ambulatory without assisted device and leaving with family member via private vehicle w/son Eric.     Discharge instructions provided and patient acknowledges appropriately. Prescriptions  sent none.  No further questions or concerns at this time.     Pt tolerated PO tylenol and no difficulty swallowing and ambulated w/o assistance and w/o device.

## 2022-04-27 LAB
ATRIAL RATE - MUSE: 312 BPM
DIASTOLIC BLOOD PRESSURE - MUSE: NORMAL MMHG
INTERPRETATION ECG - MUSE: NORMAL
P AXIS - MUSE: NORMAL DEGREES
PR INTERVAL - MUSE: NORMAL MS
QRS DURATION - MUSE: 158 MS
QT - MUSE: 494 MS
QTC - MUSE: 513 MS
R AXIS - MUSE: -68 DEGREES
SYSTOLIC BLOOD PRESSURE - MUSE: NORMAL MMHG
T AXIS - MUSE: 96 DEGREES
VENTRICULAR RATE- MUSE: 65 BPM

## 2022-08-18 ENCOUNTER — APPOINTMENT (OUTPATIENT)
Dept: CT IMAGING | Facility: HOSPITAL | Age: 87
End: 2022-08-18
Attending: EMERGENCY MEDICINE
Payer: MEDICARE

## 2022-08-18 ENCOUNTER — HOSPITAL ENCOUNTER (EMERGENCY)
Facility: HOSPITAL | Age: 87
Discharge: HOME | End: 2022-08-18
Attending: EMERGENCY MEDICINE | Admitting: EMERGENCY MEDICINE
Payer: MEDICARE

## 2022-08-18 VITALS
DIASTOLIC BLOOD PRESSURE: 73 MMHG | SYSTOLIC BLOOD PRESSURE: 139 MMHG | RESPIRATION RATE: 18 BRPM | HEART RATE: 65 BPM | OXYGEN SATURATION: 100 % | TEMPERATURE: 98 F

## 2022-08-18 DIAGNOSIS — S00.83XA FACIAL CONTUSION, INITIAL ENCOUNTER: ICD-10-CM
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
## 2022-08-18 LAB
ANION GAP SERPL CALCULATED.3IONS-SCNC: 6 MMOL/L (ref 5–18)
BUN SERPL-MCNC: 22 MG/DL (ref 8–28)
CALCIUM SERPL-MCNC: 9 MG/DL (ref 8.5–10.5)
CHLORIDE BLD-SCNC: 108 MMOL/L (ref 98–107)
CO2 SERPL-SCNC: 28 MMOL/L (ref 22–31)
CREAT SERPL-MCNC: 1.13 MG/DL (ref 0.7–1.3)
ERYTHROCYTE [DISTWIDTH] IN BLOOD BY AUTOMATED COUNT: 12.7 % (ref 10–15)
GFR SERPL CREATININE-BSD FRML MDRD: 62 ML/MIN/1.73M2
GLUCOSE BLD-MCNC: 109 MG/DL (ref 70–125)
HCT VFR BLD AUTO: 42.8 % (ref 40–53)
HGB BLD-MCNC: 13.8 G/DL (ref 13.3–17.7)
MCH RBC QN AUTO: 30.2 PG (ref 26.5–33)
MCHC RBC AUTO-ENTMCNC: 32.2 G/DL (ref 31.5–36.5)
MCV RBC AUTO: 94 FL (ref 78–100)
PLATELET # BLD AUTO: 158 10E3/UL (ref 150–450)
POTASSIUM BLD-SCNC: 3.9 MMOL/L (ref 3.5–5)
RBC # BLD AUTO: 4.57 10E6/UL (ref 4.4–5.9)
SODIUM SERPL-SCNC: 142 MMOL/L (ref 136–145)
WBC # BLD AUTO: 5.3 10E3/UL (ref 4–11)

## 2022-08-18 PROCEDURE — 99285 EMERGENCY DEPT VISIT HI MDM: CPT | Mod: 25

## 2022-08-18 PROCEDURE — G1010 CDSM STANSON: HCPCS

## 2022-08-18 PROCEDURE — 250N000009 HC RX 250: Performed by: EMERGENCY MEDICINE

## 2022-08-18 PROCEDURE — 36415 COLL VENOUS BLD VENIPUNCTURE: CPT | Performed by: EMERGENCY MEDICINE

## 2022-08-18 PROCEDURE — 85027 COMPLETE CBC AUTOMATED: CPT | Performed by: EMERGENCY MEDICINE

## 2022-08-18 PROCEDURE — 80048 BASIC METABOLIC PNL TOTAL CA: CPT | Performed by: EMERGENCY MEDICINE

## 2022-08-18 RX ORDER — GINSENG 100 MG
CAPSULE ORAL ONCE
Status: COMPLETED | OUTPATIENT
Start: 2022-08-18 | End: 2022-08-18

## 2022-08-18 RX ADMIN — BACITRACIN 1 APPLICATION.: 500 OINTMENT TOPICAL at 15:30

## 2022-08-18 ASSESSMENT — ACTIVITIES OF DAILY LIVING (ADL)
ADLS_ACUITY_SCORE: 35
ADLS_ACUITY_SCORE: 35

## 2022-08-18 NOTE — ED NOTES
The pt's L elbow skin tear was redressed with non-stick gauze and bacitracin prior to discharge. Dressing supplies given upon discharge.

## 2022-08-18 NOTE — ED PROVIDER NOTES
EMERGENCY DEPARTMENT ENCOUNTER      NAME: Tre Van  AGE: 89 year old male  YOB: 1932  MRN: [id number]  EVALUATION DATE & TIME: 8/18/2022 12:53 PM    PCP: Kayli Che    ED PROVIDER: Bryant Wheeler M.D.      Chief Complaint   Patient presents with     Fall     trauma alert         FINAL IMPRESSION:  Fall  Forehead contusion    ED COURSE & MEDICAL DECISION MAKING:    Pertinent Labs & Imaging studies reviewed. (See chart for details)  89 year old male presents to the Emergency Department for evaluation of bruise after mechanical fall.  Patient reports tripping while bringing his trash can.  Patient does live independently.  Patient with obvious contusion/soft tissue swelling along the lateral aspect of the left orbit.  No hyphema.  Neck nontender and supple.  Good range of motion of upper and lower extremities.  Patient ambulatory after the event after being assisted to his feet.  However patient does take a NOAC.  Plan will be for CT imaging the head and neck.  Baseline blood repeat obtained assess for contributory electrolyte imbalance or anemia which could have led to his fall.. Patient appears non toxic with stable vitals signs. Overall exam is benign.          1:02 PM I met with the patient for the initial interview and physical examination. Discussed plan for treatment and workup in the ED.    2:11 PM.  CT imaging unremarkable.  Laboratory evaluation pending.  Patient discussed with my associate end of shift.  Labs will be reviewed.  If unremarkable patient appropriate for discharge with simple mechanical fall.  2:42 PM.  Laboratory evaluation unremarkable.  Patient cleared for discharge.  At the conclusion of the encounter I discussed the results of all of the tests and the disposition. The questions were answered and return precautions provided. The patient or family acknowledged understanding and was agreeable with the care plan.       PPE: Provider wore gloves, surgical cap,  and paper mask.     MEDICATIONS GIVEN IN THE EMERGENCY:  Medications - No data to display    NEW PRESCRIPTIONS STARTED AT TODAY'S ER VISIT  New Prescriptions    No medications on file          =================================================================    HPI    Patient information was obtained from: Patient    Use of Intrepreter: N/A         Tre Van is a 89 year old male with a pertient medical history of DM2, HTN, JÚNIOR, atrial fibrillation, pacemaker, anticoagulated who presents to the ED for evaluation of fall.     Patient reports falling and hitting his head and elbow on concrete as he was bringing the trash and tripped on the curb. He wasn't able to get up, called for help. EMS picked him up, and he was able to walk to the ambulance. He lives independently. Patient has visible wound lateral to left eye.       REVIEW OF SYSTEMS   Constitutional:  Denies fever, chills  Respiratory:  Denies productive cough or increased work of breathing  Cardiovascular:  Denies chest pain, palpitations  GI:  Denies abdominal pain, nausea, vomiting, or change in bowel or bladder habits   Musculoskeletal:  Denies any new muscle/joint swelling  Skin:  Denies rash. Positive for wound (head)   Neurologic:  Denies focal weakness. Positive for fall  All systems negative except as marked.     PAST MEDICAL HISTORY:  History reviewed. No pertinent past medical history.    PAST SURGICAL HISTORY:  Past Surgical History:   Procedure Laterality Date     EYE SURGERY Bilateral     cataracts     HERNIA REPAIR Left     inguinal     IMPLANT PACEMAKER Left 10/12/2012     JOINT REPLACEMENT Left 2006    shoulder     OTHER SURGICAL HISTORY Bilateral 2016    gel injections to both knees     IN SIGMOIDOSCOPY FLX CONTROL BLEEDING N/A 5/3/2017    Procedure: SIGMOIDOSCOPY with biopsy ;  Surgeon: Maddison Taylor MD;  Location: [facility];  Service: Gastroenterology     RELEASE CARPAL TUNNEL Left      TONSILLECTOMY           CURRENT  MEDICATIONS:    No current facility-administered medications for this encounter.    Current Outpatient Medications:      acetaminophen (TYLENOL) 500 MG tablet, [ACETAMINOPHEN (TYLENOL) 500 MG TABLET] Take 1,000 mg by mouth 3 (three) times a day as needed for pain., Disp: , Rfl:      aspirin 81 mg chewable tablet, [ASPIRIN 81 MG CHEWABLE TABLET] Chew 1 tablet (81 mg total) daily., Disp: 30 tablet, Rfl: 0     fluticasone (FLONASE) 50 mcg/actuation nasal spray, [FLUTICASONE (FLONASE) 50 MCG/ACTUATION NASAL SPRAY] 1 spray into each nostril daily as needed for rhinitis., Disp: 16 g, Rfl: 0     metFORMIN (GLUCOPHAGE) 500 MG tablet, [METFORMIN (GLUCOPHAGE) 500 MG TABLET] Take 0.5 tablets (250 mg total) by mouth 2 (two) times a day with meals., Disp: 60 tablet, Rfl: 0     rivaroxaban 20 mg Tab, [RIVAROXABAN 20 MG TAB] Take 1 tablet (20 mg total) by mouth daily with supper., Disp: 30 tablet, Rfl: 0     simvastatin (ZOCOR) 40 MG tablet, [SIMVASTATIN (ZOCOR) 40 MG TABLET] Take 1 tablet (40 mg total) by mouth at bedtime., Disp: 30 tablet, Rfl: 0     sotalol (BETAPACE) 80 MG tablet, [SOTALOL (BETAPACE) 80 MG TABLET] Take  mg by mouth 2 (two) times a day. Take 1.5 tablets (120 mg) every morning and 1 tablet (80 mg) every evening, Disp: , Rfl:      vit A/vit C/vit E/zinc/copper (PRESERVISION AREDS ORAL), [VIT A/VIT C/VIT E/ZINC/COPPER (PRESERVISION AREDS ORAL)] Take 1 tablet by mouth 2 (two) times a day., Disp: , Rfl:     ALLERGIES:  Allergies   Allergen Reactions     Grapefruit [Grapefruit Extract] Unknown     Reaction: eye redness, bleeding gums       FAMILY HISTORY:  Family History   Problem Relation Age of Onset     No Known Problems Mother         Bled to death age 87     Heart Disease Father 56.00     Colitis Son        SOCIAL HISTORY:   Social History     Socioeconomic History     Marital status:      Spouse name: None     Number of children: None     Years of education: None     Highest education level: None    Tobacco Use     Smoking status: Former Smoker     Packs/day: 1.50     Years: 23.00     Pack years: 34.50     Quit date: 1967     Years since quittin.6     Smokeless tobacco: Never Used   Substance and Sexual Activity     Alcohol use: Yes     Comment: Alcoholic Drinks/day: Small amounts of alcohol on special occasions     Drug use: No   Social History Narrative    Lives alone. In 2 [place] house.  2017       VITALS:  Patient Vitals for the past 24 hrs:   BP Temp Temp src Pulse Resp SpO2   22 1308 -- 98  F (36.7  C) Oral -- 18 --   22 1300 139/73 -- -- 65 -- 100 %        PHYSICAL EXAM    Constitutional:  Awake, alert, in no apparent distress  HENT:  Normocephalic, Atraumatic. Bilateral external ears normal. Oropharynx moist. Nose normal. Neck- Normal range of motion with no guarding, No midline cervical tenderness, Supple, No stridor.   Eyes:  PERRL, EOMI with no signs of entrapment, Conjunctiva normal, No discharge. No hyphema.   Respiratory:  Normal breath sounds, No respiratory distress, No wheezing.    Cardiovascular:  Normal heart rate, Normal rhythm, No appreciable rubs or gallops.   GI:  Soft, No tenderness, No distension, No palpable masses  Musculoskeletal:   No edema. Good range of motion in all major joints. No tenderness to palpation or major deformities noted.  Integument:  Warm, Dry, No erythema, No rash. Soft tissue ecchymosis to left lateral orbital with 2 cm laceration.   Neurologic:  Alert & oriented, Normal motor function, Normal sensory function, No focal deficits noted.   Psychiatric:  Affect normal, Judgment normal, Mood normal.     LAB:  All pertinent labs reviewed and interpreted.       RADIOLOGY:  Reviewed all pertinent imaging. Please see official radiology report.  Cervical spine CT w/o contrast    Result Date: 2022  EXAM: CT HEAD W/O CONTRAST, CT CERVICAL SPINE W/O CONTRAST LOCATION: [facility] DATE/TIME: 2022 1:37 PM INDICATION:  Fall, blow to left supraorbital region, takes NOAC COMPARISON: CT head and cervical spine 04/26/2022 TECHNIQUE: 1) Routine CT Head without IV contrast. Multiplanar reformats. Dose reduction techniques were used. 2) Routine CT Cervical Spine without IV contrast. Multiplanar reformats. Dose reduction techniques were used. FINDINGS: HEAD CT: INTRACRANIAL CONTENTS: No intracranial hemorrhage, extraaxial collection, or mass effect.  No CT evidence of acute infarct. Mild presumed chronic small vessel ischemic changes. Mild to moderate generalized volume loss. No hydrocephalus. VISUALIZED ORBITS/SINUSES/MASTOIDS: Prior bilateral cataract surgery. Visualized portions of the orbits are otherwise unremarkable. Mild mucosal thickening involving ethmoid septa. No middle ear or mastoid effusion. BONES/SOFT TISSUES: Left frontal/supraorbital scalp swelling. No skull fracture. CERVICAL SPINE CT: VERTEBRA: Unchanged straightened cervical lordosis and 3 mm degenerative type anterolisthesis at C7-T1. Unchanged vertebral body heights. No fracture or posttraumatic subluxation. CANAL/FORAMINA: Multilevel cervical spondylosis with moderate to advanced loss of disc height at multiple levels from C4 through C7. Associated circumferential endplate/uncovertebral spurring at these levels and mild diffuse cervical facet arthropathy. At C5-C6, severe spinal canal stenosis and severe bilateral neural foraminal stenosis similar to the previous exam. At C3-C4, C4-C5 and C6-C7, mild spinal canal stenosis and severe bilateral neural foraminal stenosis. PARASPINAL: Prevertebral soft tissues within normal limits for thickness. Visualized lung fields are clear.     IMPRESSION: HEAD CT: 1.  No CT evidence for acute intracranial process. 2.  Left frontal scalp contusion without associated fracture. 3.  Brain atrophy and presumed chronic microvascular ischemic changes as above. CERVICAL SPINE CT: 1.  No CT evidence for acute fracture or post traumatic  subluxation. 2.  Multilevel cervical spondylosis greatest at C5-C6 as detailed above. This is similar to findings on the previous exam.    Head CT w/o contrast    Result Date: 8/18/2022  EXAM: CT HEAD W/O CONTRAST, CT CERVICAL SPINE W/O CONTRAST LOCATION: [facility] DATE/TIME: 8/18/2022 1:37 PM INDICATION: Fall, blow to left supraorbital region, takes NOAC COMPARISON: CT head and cervical spine 04/26/2022 TECHNIQUE: 1) Routine CT Head without IV contrast. Multiplanar reformats. Dose reduction techniques were used. 2) Routine CT Cervical Spine without IV contrast. Multiplanar reformats. Dose reduction techniques were used. FINDINGS: HEAD CT: INTRACRANIAL CONTENTS: No intracranial hemorrhage, extraaxial collection, or mass effect.  No CT evidence of acute infarct. Mild presumed chronic small vessel ischemic changes. Mild to moderate generalized volume loss. No hydrocephalus. VISUALIZED ORBITS/SINUSES/MASTOIDS: Prior bilateral cataract surgery. Visualized portions of the orbits are otherwise unremarkable. Mild mucosal thickening involving ethmoid septa. No middle ear or mastoid effusion. BONES/SOFT TISSUES: Left frontal/supraorbital scalp swelling. No skull fracture. CERVICAL SPINE CT: VERTEBRA: Unchanged straightened cervical lordosis and 3 mm degenerative type anterolisthesis at C7-T1. Unchanged vertebral body heights. No fracture or posttraumatic subluxation. CANAL/FORAMINA: Multilevel cervical spondylosis with moderate to advanced loss of disc height at multiple levels from C4 through C7. Associated circumferential endplate/uncovertebral spurring at these levels and mild diffuse cervical facet arthropathy. At C5-C6, severe spinal canal stenosis and severe bilateral neural foraminal stenosis similar to the previous exam. At C3-C4, C4-C5 and C6-C7, mild spinal canal stenosis and severe bilateral neural foraminal stenosis. PARASPINAL: Prevertebral soft tissues within normal limits for  thickness. Visualized lung fields are clear.     IMPRESSION: HEAD CT: 1.  No CT evidence for acute intracranial process. 2.  Left frontal scalp contusion without associated fracture. 3.  Brain atrophy and presumed chronic microvascular ischemic changes as above. CERVICAL SPINE CT: 1.  No CT evidence for acute fracture or post traumatic subluxation. 2.  Multilevel cervical spondylosis greatest at C5-C6 as detailed above. This is similar to findings on the previous exam.        I, RUDOLPH SYLVESTER, am serving as a scribe to document services personally performed by Bryant Wheeler MD, based on my observation and the provider's statements to me. I, Bryant Wheeler MD attest that RUDOLPH SYLVESTER is acting in a scribe capacity, has observed my performance of the services and has documented them in accordance with my direction.    Bryant Wheeler M.D.  Emergency Medicine  [facility] EMERGENCY DEPARTMENT       Bryant Wheeler MD  08/18/22 [id number]       Bryant Wheeler MD  08/18/22 [id number]

## 2022-08-18 NOTE — ED TRIAGE NOTES
The pt presents for evaluation after a mechanical fall in his driveway. He was waking his trash to the curb when he thinks that he tripped on the curb, landing prone, striking his the L side of his head. Swelling and ecchymosis noted lateral to the eyebrow. Pupils equal and reactive, and the pt denies any visual changes. There is no C spine tenderness, and no chest, shoulder, arm, pelvic, or hip pain. He was able to walk for the medics. The pt also has a skin tear to his L elbow, and some mild L rib pain. His L knee is also sore. Neuro exam negative for deficits.  strength equal, no arm drift, lower extremity strength equal. No sensory deficits. Per protocol, trauma alert called due to fall and head injury on thinners.

## 2022-08-18 NOTE — ED NOTES
Bed: JNEDP-10  Expected date:   Expected time:   Means of arrival:   Comments:  [facility] - 89 y M Fall + thinners

## 2022-09-29 ENCOUNTER — LAB REQUISITION (OUTPATIENT)
Dept: LAB | Facility: CLINIC | Age: 87
End: 2022-09-29
Payer: MEDICARE

## 2022-09-29 DIAGNOSIS — E53.8 DEFICIENCY OF OTHER SPECIFIED B GROUP VITAMINS: ICD-10-CM

## 2022-09-29 DIAGNOSIS — E55.9 VITAMIN D DEFICIENCY, UNSPECIFIED: ICD-10-CM

## 2022-09-29 LAB — VIT B12 SERPL-MCNC: 486 PG/ML (ref 232–1245)

## 2022-09-29 PROCEDURE — 82607 VITAMIN B-12: CPT | Mod: ORL | Performed by: NURSE PRACTITIONER

## 2022-09-29 PROCEDURE — 82306 VITAMIN D 25 HYDROXY: CPT | Mod: ORL | Performed by: NURSE PRACTITIONER

## 2022-09-30 LAB — DEPRECATED CALCIDIOL+CALCIFEROL SERPL-MC: 35 UG/L (ref 20–75)

## 2022-11-09 ENCOUNTER — LAB REQUISITION (OUTPATIENT)
Dept: LAB | Facility: CLINIC | Age: 87
End: 2022-11-09
Payer: MEDICARE

## 2022-11-09 DIAGNOSIS — N18.31 CHRONIC KIDNEY DISEASE, STAGE 3A (H): ICD-10-CM

## 2022-11-09 LAB
ANION GAP SERPL CALCULATED.3IONS-SCNC: 14 MMOL/L (ref 7–15)
BUN SERPL-MCNC: 26.1 MG/DL (ref 8–23)
CALCIUM SERPL-MCNC: 9.3 MG/DL (ref 8.2–9.6)
CHLORIDE SERPL-SCNC: 106 MMOL/L (ref 98–107)
CREAT SERPL-MCNC: 1.3 MG/DL (ref 0.67–1.17)
DEPRECATED HCO3 PLAS-SCNC: 22 MMOL/L (ref 22–29)
GFR SERPL CREATININE-BSD FRML MDRD: 52 ML/MIN/1.73M2
GLUCOSE SERPL-MCNC: 164 MG/DL (ref 70–99)
POTASSIUM SERPL-SCNC: 4.3 MMOL/L (ref 3.4–5.3)
SODIUM SERPL-SCNC: 142 MMOL/L (ref 136–145)

## 2022-11-09 PROCEDURE — 80048 BASIC METABOLIC PNL TOTAL CA: CPT | Mod: ORL | Performed by: NURSE PRACTITIONER

## 2023-02-11 ENCOUNTER — APPOINTMENT (OUTPATIENT)
Dept: CT IMAGING | Facility: HOSPITAL | Age: 88
End: 2023-02-11
Attending: PHYSICIAN ASSISTANT
Payer: MEDICARE

## 2023-02-11 ENCOUNTER — HOSPITAL ENCOUNTER (EMERGENCY)
Facility: HOSPITAL | Age: 88
Discharge: HOME | End: 2023-02-11
Attending: EMERGENCY MEDICINE | Admitting: EMERGENCY MEDICINE
Payer: MEDICARE

## 2023-02-11 VITALS
TEMPERATURE: 97.8 F | DIASTOLIC BLOOD PRESSURE: 61 MMHG | OXYGEN SATURATION: 99 % | BODY MASS INDEX: 27.98 KG/M2 | HEIGHT: 75 IN | SYSTOLIC BLOOD PRESSURE: 115 MMHG | RESPIRATION RATE: 16 BRPM | WEIGHT: 225 LBS | HEART RATE: 65 BPM

## 2023-02-11 DIAGNOSIS — Z79.01 ANTICOAGULATED: ICD-10-CM
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
## 2023-02-11 DIAGNOSIS — S00.83XA FACIAL CONTUSION, INITIAL ENCOUNTER: ICD-10-CM

## 2023-02-11 LAB — GLUCOSE BLDC GLUCOMTR-MCNC: 168 MG/DL (ref 70–99)

## 2023-02-11 PROCEDURE — G1010 CDSM STANSON: HCPCS

## 2023-02-11 PROCEDURE — 250N000013 HC RX MED GY IP 250 OP 250 PS 637: Performed by: EMERGENCY MEDICINE

## 2023-02-11 PROCEDURE — 82962 GLUCOSE BLOOD TEST: CPT

## 2023-02-11 PROCEDURE — 99285 EMERGENCY DEPT VISIT HI MDM: CPT | Mod: 25

## 2023-02-11 RX ORDER — ACETAMINOPHEN 325 MG/1
650 TABLET ORAL ONCE
Status: COMPLETED | OUTPATIENT
Start: 2023-02-11 | End: 2023-02-11

## 2023-02-11 RX ADMIN — ACETAMINOPHEN 650 MG: 325 TABLET ORAL at 13:03

## 2023-02-11 ASSESSMENT — ENCOUNTER SYMPTOMS
VOMITING: 0
DIZZINESS: 0
NUMBNESS: 0
SHORTNESS OF BREATH: 0
LIGHT-HEADEDNESS: 0
BACK PAIN: 0
CONFUSION: 0
FEVER: 0
WOUND: 1
NECK PAIN: 0
WEAKNESS: 0
ABDOMINAL PAIN: 0
COUGH: 0

## 2023-02-11 ASSESSMENT — ACTIVITIES OF DAILY LIVING (ADL): ADLS_ACUITY_SCORE: 35

## 2023-02-11 NOTE — ED PROVIDER NOTES
EMERGENCY DEPARTMENT ENCOUNTER      NAME: Tre Van  AGE: 89 year old male  YOB: 1932  MRN: [id number]  EVALUATION DATE & TIME: 2/11/2023 12:49 PM    PCP: Kayli Che    ED PROVIDER: Babak Barrett PA-C      Chief Complaint   Patient presents with     Fall     Trauma     Fall on thinners - hit head         FINAL IMPRESSION:  1. Fall, initial encounter    2. Anticoagulated    3. Facial contusion, initial encounter          ED COURSE & MEDICAL DECISION MAKING:    Pertinent Labs & Imaging studies reviewed. (See chart for details)  12:49 PM I met the patient and performed my initial interview and exam. PPE worn: n95 mask, eye protection, and gloves. Staffed with Dr. Mala Bangura.  2:25 PM I rechecked and updated the patient. Discussed plan for discharge. Patient was able to ambulate to and from the bathroom with no difficulty.     89 year old male presents to the Emergency Department for evaluation of fall, on anticoagulation.     ED Course as of 02/11/23 1520   Sat Feb 11, 2023   1256 Patient is a 89-year-old male, past medical history of type 2 diabetes, atrial fibrillation, anticoagulated on rivaroxaban, history of fall, periorbital ecchymosis, presents emergency department for evaluation of slipping out of bed.  Patient reports that he was sitting on the side of the bed, the bed is relatively soft, he slipped forward, following, hitting the right side of his anterior forehead on the door frame.  Denies any nausea, vomiting, chest pain, shortness of breath, dizziness, lightheadedness.  Denies any changes in vision.  No numbness or tingling.  Notes chronic left-sided knee pain, however no other acute abnormalities.  He does have a hematoma to the right anterior forehead, above the right eyeball, laterally to the field of vision.  No acute visual changes.  The remainder of his examination is unremarkable, he has no pelvic, bilateral knee, arm, wrist, shoulder, chest, back,  neck pain.  Denies any other pain.  Remembers the entire thing, denies losing consciousness.  Notes that he takes blood thinners.  Is alert and oriented otherwise.  Denies anything for pain at this time.  Pelvis appears stable.  Abdomen soft and nontender.  Patient has not tachycardic or tachypneic.  Pulses are equal bilaterally.  Notes that he is seeing an orthopedic surgeon on Monday for his chronic left-sided knee pain.  No evidence of any laceration that would be repairable here in the emergency department.  Given that he is a fall on blood thinners, he was called a trauma alert by nursing staff on arrival, and will have expedited imaging of his head, C-spine, as well as his facial bones.  He is otherwise very stable here in the emergency department.  Pending imaging at this time.   1423 Head CT w/o contrast  CT head shows no evidence of any acute intracranial process, brain atrophy as noted above, microvascular changes.  Right periorbital soft tissue hematoma, no facial bone or fracture.  No fracture or subluxation in the cervical spine, multiple severe osseous foraminal stenosis throughout the central cervical spine.   1426 Per nursing, patient was able to ambulate to the bathroom with a walker, no abnormalities or acute problems with walking.  I discussed the CT results with the patient, and family member who is at bedside.  No evidence of any acute bleeding, or other acute abnormality noted on CT scans.  Given the patient feels well, is able to ambulate, he will be discharged home.  Patient and family are agreeable with this plan.     Medical Decision Making    History:    Supplemental history from: Documented in chart, if applicable    External Record(s) reviewed: Documented in chart, if applicable.    Work Up:    Chart documentation includes differential considered and any EKGs or imaging independently interpreted by provider, where specified.    In additional to work up documented, I considered the  "following work up: Documented in chart, if applicable.    External consultation:    Discussion of management with another provider: Documented in chart, if applicable    Complicating factors:    Care impacted by chronic illness: Anticoagulated State, Diabetes, Hyperlipidemia and Hypertension    Care affected by social determinants of health: N/A    Disposition considerations: Discharge. No recommendations on prescription strength medication(s). N/A.     At the conclusion of the encounter I discussed the results of all of the tests and the disposition. The questions were answered. The patient or family acknowledged understanding and was agreeable with the care plan.     0 minutes of critical care time     MEDICATIONS GIVEN IN THE EMERGENCY:  Medications   acetaminophen (TYLENOL) tablet 650 mg (650 mg Oral Given 2/11/23 1303)       NEW PRESCRIPTIONS STARTED AT TODAY'S ER VISIT  New Prescriptions    No medications on file     =================================================================    HPI    Patient information was obtained from: Patient     Use of : N/A         Tre Van is a 89 year old male with a pertinent history of HTN, atrial fibrillation, chronic anticoagulation use, DM II, HLD, JÚNIOR, and s/p implant pacemaker, who presents to this ED by ambulance for evaluation of fall.     Prior to arrival, the patient was sitting on his bed, when he slipped forward and hit the right side of his forehead. He denies loss of consciousness and syncope. The patient states that his bed was \"soft\" and \"just feel forward.\" The patient sustained a laceration to his right eyebrow and endorses minimal pain to that area. He denies associated neck pain. He denies pain to his arms/wrists. The patient does mention having chronic back pain and chronic left knee pain, but notes that these symptoms are no worse than usual. The patient otherwise denies any other injuries or complaints at this time. He is on " Xarelto.    REVIEW OF SYSTEMS   Review of Systems   Musculoskeletal: Negative for neck pain.        Positive for fall and right eyebrow pain.  Negative for arm/wrist pain.   Skin: Positive for wound.   Neurological: Negative for syncope.        Positive for head injury.   Negative for loss of consciousness.   All other systems reviewed and are negative.      PAST MEDICAL HISTORY:  Past Medical History:   Diagnosis Date     Diabetes (H)      Pacemaker        PAST SURGICAL HISTORY:  Past Surgical History:   Procedure Laterality Date     EYE SURGERY Bilateral     cataracts     HERNIA REPAIR Left     inguinal     IMPLANT PACEMAKER Left 10/12/2012     JOINT REPLACEMENT Left 2006    shoulder     OTHER SURGICAL HISTORY Bilateral 2016    gel injections to both knees     CO SIGMOIDOSCOPY FLX CONTROL BLEEDING N/A 5/3/2017    Procedure: SIGMOIDOSCOPY with biopsy ;  Surgeon: Maddison Taylor MD;  Location: [facility];  Service: Gastroenterology     RELEASE CARPAL TUNNEL Left      TONSILLECTOMY             CURRENT MEDICATIONS:    acetaminophen (TYLENOL) 500 MG tablet  aspirin 81 mg chewable tablet  fluticasone (FLONASE) 50 mcg/actuation nasal spray  metFORMIN (GLUCOPHAGE) 500 MG tablet  rivaroxaban 20 mg Tab  simvastatin (ZOCOR) 40 MG tablet  sotalol (BETAPACE) 80 MG tablet  vit A/vit C/vit E/zinc/copper (PRESERVISION AREDS ORAL)         ALLERGIES:  Allergies   Allergen Reactions     Grapefruit [Grapefruit Extract] Unknown     Reaction: eye redness, bleeding gums       FAMILY HISTORY:  Family History   Problem Relation Age of Onset     No Known Problems Mother         Bled to death age 87     Heart Disease Father 56.00     Colitis Son        SOCIAL HISTORY:   Social History     Socioeconomic History     Marital status:    Tobacco Use     Smoking status: Former     Packs/day: 1.50     Years: 23.00     Pack years: 34.50     Types: Cigarettes     Quit date: 1967     Years since quittin.1     Smokeless tobacco:  "Never   Substance and Sexual Activity     Alcohol use: Yes     Comment: Alcoholic Drinks/day: Small amounts of alcohol on special occasions     Drug use: No   Social History Narrative    Lives alone. In 2 story house.  5/2017       VITALS:  /62   Pulse 65   Temp 97.8  F (36.6  C) (Oral)   Resp 16   Ht 1.905 m (6' 3\")   Wt 102.1 kg (225 lb)   SpO2 98%   BMI 28.12 kg/m      PHYSICAL EXAM    Physical Exam  Vitals and nursing note reviewed.   Constitutional:       General: He is not in acute distress.     Appearance: Normal appearance. He is normal weight. He is not toxic-appearing or diaphoretic.   HENT:      Head: Normocephalic.      Comments: Patient has hematoma, present over the right side, over the right forehead, at the level of the eyebrow on the right side.  Very small scabbing over the area, however no open laceration.  Ecchymosis does not extend into the areas immediately surrounding the left eye.     Right Ear: External ear normal.      Left Ear: External ear normal.      Nose: Nose normal.   Eyes:      Extraocular Movements: Extraocular movements intact.      Pupils: Pupils are equal, round, and reactive to light.      Comments: No issues with extraocular eye movements.  Vision grossly intact.   Cardiovascular:      Rate and Rhythm: Normal rate and regular rhythm.      Heart sounds: Normal heart sounds. No murmur heard.    No friction rub. No gallop.   Pulmonary:      Effort: Pulmonary effort is normal. No respiratory distress.      Breath sounds: No wheezing.   Abdominal:      General: Abdomen is flat. Bowel sounds are normal. There is no distension.      Palpations: Abdomen is soft.      Tenderness: There is no abdominal tenderness. There is no right CVA tenderness, left CVA tenderness, guarding or rebound.   Musculoskeletal:         General: No tenderness, deformity or signs of injury. Normal range of motion.      Cervical back: No tenderness.      Right lower leg: No edema.      Left lower " leg: No edema.   Skin:     General: Skin is warm.      Findings: Bruising present. No erythema or rash.      Comments: Bruising to the right anterior forehead over the right eyebrow, as well is diffuse bruising to the bilateral lower hands, dorsal aspect. ROM intact.    Neurological:      General: No focal deficit present.      Mental Status: He is alert.      Sensory: No sensory deficit.      Motor: No weakness.         LAB:  All pertinent labs reviewed and interpreted.  Labs Ordered and Resulted from Time of ED Arrival to Time of ED Departure   GLUCOSE BY METER - Abnormal       Result Value    GLUCOSE BY METER POCT 168 (*)    GLUCOSE MONITOR NURSING POCT       RADIOLOGY:  Reviewed all pertinent imaging. Please see official radiology report.  CT Facial Bones without Contrast   Final Result   IMPRESSION:   HEAD CT:   1.  No CT evidence for acute intracranial process.   2.  Brain atrophy and presumed chronic microvascular ischemic changes as above.      FACIAL BONE CT:   1.  Right periorbital soft tissue hematoma. No facial bone or mandibular fracture.      CERVICAL SPINE CT:   1.  No fracture or posttraumatic subluxation.   2.  Severe C5-C6 spinal canal stenosis. Multiple severe osseous foraminal stenoses throughout the cervical spine. No significant interval change.      Cervical spine CT w/o contrast   Final Result   IMPRESSION:   HEAD CT:   1.  No CT evidence for acute intracranial process.   2.  Brain atrophy and presumed chronic microvascular ischemic changes as above.      FACIAL BONE CT:   1.  Right periorbital soft tissue hematoma. No facial bone or mandibular fracture.      CERVICAL SPINE CT:   1.  No fracture or posttraumatic subluxation.   2.  Severe C5-C6 spinal canal stenosis. Multiple severe osseous foraminal stenoses throughout the cervical spine. No significant interval change.      Head CT w/o contrast   Final Result   IMPRESSION:   HEAD CT:   1.  No CT evidence for acute intracranial process.   2.   Brain atrophy and presumed chronic microvascular ischemic changes as above.      FACIAL BONE CT:   1.  Right periorbital soft tissue hematoma. No facial bone or mandibular fracture.      CERVICAL SPINE CT:   1.  No fracture or posttraumatic subluxation.   2.  Severe C5-C6 spinal canal stenosis. Multiple severe osseous foraminal stenoses throughout the cervical spine. No significant interval change.        PROCEDURES:   None.       IBarber Cha, am serving as a scribe to document services personally performed by Babak Barrett PA-C, based on my observation and the provider's statements to me. I, Babak Barrett PA-C, attest that Barber Wells is acting in a scribe capacity, has observed my performance of the services and has documented them in accordance with my direction.    Babak Barrett PA-C  Emergency Medicine  [facility] EMERGENCY DEPARTMENT  [address] [facility] [address]  [phone]  Dept: [phone]     Babak Barrett PA-C  02/11/23 152

## 2023-02-11 NOTE — ED PROVIDER NOTES
EMERGENCY DEPARTMENT ENCOUNTER      NAME: Tre Van  AGE: 89 year old male  YOB: 1932  MRN: [id number]  EVALUATION DATE & TIME: 2023 12:49 PM    PCP: Kayli Che    ED PROVIDER: Mala Bangura M.D.        Chief Complaint   Patient presents with     Fall     Trauma     Fall on thinners - hit head         FINAL IMPRESSION:    1. Fall, initial encounter    2. Anticoagulated    3. Facial contusion, initial encounter            MEDICAL DECISION MAKIN year old male with history of diabetes, atrial fibrillation on rivaroxaban presents emergency department after mechanical fall.  He was sitting on the bed today and he leaned forward and ended up falling and hitting his head.  He landed on his knees.  He did hit his right eyebrow.  CT head, face, cervical spine without significant traumatic injuries.  Does have a hematoma to this eyebrow area.  Patient has been amatory in the ER without difficulty.  At this time I feel he is safe for discharge home.  All of his questions have been answered.      ED COURSE:  12:52 PM  I met with the patient to gather history and perform my exam. ED course and treatment discussed.    2:25 PM  CT imaging is unremarkable.  Plan at this time is discharge home.  Has been ambulatory to the bathroom without difficulty.  This is consistent with mechanical fall I do not think he requires any further work-up at this time.  Patient updated on results and agrees with the plan for discharge.  All of his questions have been answered.    COVID-19 PPE worn during patient evaluation:  Mask: n95 and homemade masks   Eye Protection: goggles   Gown: none   Hair cover: yes  Face shield: none   Patient wearing a mask: yes     At the conclusion of the encounter the results of all of the tests and the disposition were discussed. Their questions were answered. The patient (and any family present) acknowledged understanding and were agreeable with the care  "plan.      CONSULTANTS:  none        MEDICATIONS GIVEN IN THE EMERGENCY:  Medications   acetaminophen (TYLENOL) tablet 650 mg (650 mg Oral Given 2/11/23 1303)           NEW PRESCRIPTIONS STARTED AT TODAY'S ER VISIT:     Medication List      There are no discharge medications for this visit.             CONDITION:  stable        DISPOSITION:  D.c home         =================================================================  =================================================================  TRIAGE ASSESSMENT:  Pt comes in with CRV. Pt was sitting at edge of bed, stood up to go to the bathroom and slipped. Fell onto knees, hit R eyebrow. . Hematoma, bleeding controlled. No LOC. Chronic L knee pain. Pt on Xarelto. No other physical complaints.     ED Triage Vitals   Enc Vitals Group      BP 02/11/23 1255 120/68      Pulse 02/11/23 1255 95      Resp 02/11/23 1255 16      Temp 02/11/23 1255 97.8  F (36.6  C)      Temp src 02/11/23 1255 Oral      SpO2 02/11/23 1255 99 %      Weight 02/11/23 1257 102.1 kg (225 lb)      Height 02/11/23 1257 1.905 m (6' 3\")       ================================================================  ================================================================    I am seeing this patient along with Babak Barrett, PA-C    I, Mala Bangura MD have reviewed the documentation, personally taken the patient's history, performed an exam and agree with the physical findings, diagnosis and management plan.      HPI    Patient information was obtained from: patient    Use of Intrepreter: N/A     Tre Van is a 89 year old male with history of pacemaker and Xarelto who presents to the ER with complaints of fall.    Patient was sitting on the edge of the bed and he said the mattress was very soft and he ended up falling forward from a seated position.  He was trying to stand up at the time and just lost his balance.  He denies any dizziness or syncope or near syncope.  " He states that he fell forward and hit his head on the floor.  Denies any loss of consciousness.    He denies headache but does complain of a little right eyebrow tenderness.  He states there was bleeding from his eyebrow earlier today when he fell.    Otherwise denies any neck pain, back pain, chest pain, abdominal pain, shoulder, elbow, wrist, hip pain.  He does have some chronic knee pain for which she sees orthopedics but denies any worsening or change in that pain.      He is on Xarelto.        REVIEW OF SYSTEMS  Review of Systems   Constitutional: Negative for fever.   HENT:        +right eyebrow pain and bleeding   Respiratory: Negative for cough and shortness of breath.    Cardiovascular: Negative for chest pain.   Gastrointestinal: Negative for abdominal pain and vomiting.   Musculoskeletal: Negative for back pain and neck pain.   Allergic/Immunologic: Negative for immunocompromised state.   Neurological: Negative for dizziness, weakness, light-headedness and numbness.   Psychiatric/Behavioral: Negative for confusion.   All other systems reviewed and are negative.      PAST MEDICAL HISTORY:  Past Medical History:   Diagnosis Date     Diabetes (H)      Pacemaker          PAST SURGICAL HISTORY:  Past Surgical History:   Procedure Laterality Date     EYE SURGERY Bilateral     cataracts     HERNIA REPAIR Left     inguinal     IMPLANT PACEMAKER Left 10/12/2012     JOINT REPLACEMENT Left 2006    shoulder     OTHER SURGICAL HISTORY Bilateral 2016    gel injections to both knees     NE SIGMOIDOSCOPY FLX CONTROL BLEEDING N/A 5/3/2017    Procedure: SIGMOIDOSCOPY with biopsy ;  Surgeon: Maddison Taylor MD;  Location: [facility];  Service: Gastroenterology     RELEASE CARPAL TUNNEL Left      TONSILLECTOMY           CURRENT MEDICATIONS:    Prior to Admission medications    Medication Sig Start Date End Date Taking? Authorizing Provider   acetaminophen (TYLENOL) 500 MG tablet [ACETAMINOPHEN (TYLENOL) 500 MG  TABLET] Take 1,000 mg by mouth 3 (three) times a day as needed for pain. 5/26/17   Provider, Historical   aspirin 81 mg chewable tablet [ASPIRIN 81 MG CHEWABLE TABLET] Chew 1 tablet (81 mg total) daily. 5/6/17   Felipe Baird MD   fluticasone (FLONASE) 50 mcg/actuation nasal spray [FLUTICASONE (FLONASE) 50 MCG/ACTUATION NASAL SPRAY] 1 spray into each nostril daily as needed for rhinitis. 5/6/17   Felipe Baird MD   metFORMIN (GLUCOPHAGE) 500 MG tablet [METFORMIN (GLUCOPHAGE) 500 MG TABLET] Take 0.5 tablets (250 mg total) by mouth 2 (two) times a day with meals. 5/6/17   Felipe Baird MD   rivaroxaban 20 mg Tab [RIVAROXABAN 20 MG TAB] Take 1 tablet (20 mg total) by mouth daily with supper. 12/9/17   Karsten Zendejas MD   simvastatin (ZOCOR) 40 MG tablet [SIMVASTATIN (ZOCOR) 40 MG TABLET] Take 1 tablet (40 mg total) by mouth at bedtime. 5/6/17   Felipe Baird MD   sotalol (BETAPACE) 80 MG tablet [SOTALOL (BETAPACE) 80 MG TABLET] Take  mg by mouth 2 (two) times a day. Take 1.5 tablets (120 mg) every morning and 1 tablet (80 mg) every evening 7/20/18   Clarke Davis   vit A/vit C/vit E/zinc/copper (PRESERVISION AREDS ORAL) [VIT A/VIT C/VIT E/ZINC/COPPER (PRESERVISION AREDS ORAL)] Take 1 tablet by mouth 2 (two) times a day. 7/20/18   Provider, Historical         ALLERGIES:  Allergies   Allergen Reactions     Grapefruit [Grapefruit Extract] Unknown     Reaction: eye redness, bleeding gums         FAMILY HISTORY:  Family History   Problem Relation Age of Onset     No Known Problems Mother         Bled to death age 87     Heart Disease Father 56.00     Colitis Son          SOCIAL HISTORY:  Social History     Socioeconomic History     Marital status:      Spouse name: None     Number of children: None     Years of education: None     Highest education level: None   Tobacco Use     Smoking status: Former     Packs/day: 1.50     Years: 23.00     Pack years: 34.50     Types: Cigarettes     Quit date: 1/1/1967  "    Years since quittin.1     Smokeless tobacco: Never   Substance and Sexual Activity     Alcohol use: Yes     Comment: Alcoholic Drinks/day: Small amounts of alcohol on special occasions     Drug use: No   Social History Narrative    Lives alone. In 2 story house.  2017         VITALS:  Patient Vitals for the past 24 hrs:   BP Temp Temp src Pulse Resp SpO2 Height Weight   23 1415 115/61 -- -- 65 -- 99 % -- --   23 1358 135/66 -- -- 65 -- 99 % -- --   23 1343 124/61 -- -- 64 -- 100 % -- --   23 1300 115/62 -- -- 65 -- 98 % -- --   23 1257 -- -- -- -- -- -- 1.905 m (6' 3\") 102.1 kg (225 lb)   23 1255 120/68 97.8  F (36.6  C) Oral 95 16 99 % -- --       Wt Readings from Last 3 Encounters:   23 102.1 kg (225 lb)   22 103.4 kg (228 lb)   11/10/21 103.4 kg (228 lb)       CrCl cannot be calculated (Patient's most recent lab result is older than the maximum 30 days allowed.).      PHYSICAL EXAM    Constitutional:  Well developed, Well nourished, NAD, GCS 15  HENT:  Normocephalic, +right eyebrow hematoma with tiny area of dried blood. No gaping wound. Bilateral external ears normal, Nose normal. Neck- Supple, No stridor.   Eyes:  PERRL, EOMI, Conjunctiva normal, No discharge.  Respiratory:  Normal breath sounds, No respiratory distress, No wheezing, Speaks full sentences easily. No cough.   Cardiovascular:  Normal heart rate, Regular rhythm, No rubs, No gallops. Chest wall nontender.   GI:  No excessive obesity.  Bowel sounds normal, Soft, No tenderness, No masses, No flank tenderness. No rebound or guarding.  : deferred  Musculoskeletal: No cyanosis, No clubbing. Good range of motion in all major joints. No tenderness to palpation or major deformities noted. No tenderness of the CTLS spine.  He has full range of motion at the shoulders, elbows, hips, knees, and ankles without any reports of new bony pain or tenderness.  Integument:  Warm, Dry, No erythema, No rash.  " No petechiae.   Neurologic:  Alert & oriented x 3, Normal motor function, Normal sensory function, No focal deficits noted.   Psychiatric:  Affect normal, Cooperative          LAB:  All pertinent labs reviewed and interpreted.  Recent Results (from the past 24 hour(s))   Glucose by meter    Collection Time: 02/11/23  1:08 PM   Result Value Ref Range    GLUCOSE BY METER POCT 168 (H) 70 - 99 mg/dL       No results found for: ABORH        RADIOLOGY:  Reviewed all pertinent imaging. Please see official radiology report.    CT Facial Bones without Contrast   Final Result   IMPRESSION:   HEAD CT:   1.  No CT evidence for acute intracranial process.   2.  Brain atrophy and presumed chronic microvascular ischemic changes as above.      FACIAL BONE CT:   1.  Right periorbital soft tissue hematoma. No facial bone or mandibular fracture.      CERVICAL SPINE CT:   1.  No fracture or posttraumatic subluxation.   2.  Severe C5-C6 spinal canal stenosis. Multiple severe osseous foraminal stenoses throughout the cervical spine. No significant interval change.      Cervical spine CT w/o contrast   Final Result   IMPRESSION:   HEAD CT:   1.  No CT evidence for acute intracranial process.   2.  Brain atrophy and presumed chronic microvascular ischemic changes as above.      FACIAL BONE CT:   1.  Right periorbital soft tissue hematoma. No facial bone or mandibular fracture.      CERVICAL SPINE CT:   1.  No fracture or posttraumatic subluxation.   2.  Severe C5-C6 spinal canal stenosis. Multiple severe osseous foraminal stenoses throughout the cervical spine. No significant interval change.      Head CT w/o contrast   Final Result   IMPRESSION:   HEAD CT:   1.  No CT evidence for acute intracranial process.   2.  Brain atrophy and presumed chronic microvascular ischemic changes as above.      FACIAL BONE CT:   1.  Right periorbital soft tissue hematoma. No facial bone or mandibular fracture.      CERVICAL SPINE CT:   1.  No fracture or  posttraumatic subluxation.   2.  Severe C5-C6 spinal canal stenosis. Multiple severe osseous foraminal stenoses throughout the cervical spine. No significant interval change.            EKG:    none      PROCEDURES:  none    Medical Decision Making    History:    Supplemental history from: Documented in chart, if applicable    External Record(s) reviewed: Documented in chart, if applicable.    Work Up:    Chart documentation includes differential considered and any EKGs or imaging independently interpreted by provider, where specified.    In additional to work up documented, I considered the following work up: Documented in chart, if applicable.    External consultation:    Discussion of management with another provider: Documented in chart, if applicable    Complicating factors:    Care impacted by chronic illness: N/A    Care affected by social determinants of health: N/A    Disposition considerations: Discharge. No recommendations on prescription strength medication(s). I considered admission, but ultimately discharged patient As patient looks great and no signs of any traumatic intracranial injuries at this time.  Patient is on blood thinner but I think at this time he is low enough risk and can be monitored at his care center..          I personally saw the patient and performed a substantive portion of the visit including all aspects of the medical decision making.      Mala Bangura M.D. [facility]  Emergency Medicine and Medical Toxicology  Formerly [facility] EMERGENCY DEPARTMENT  [facility] [address] [phone]  Dept: [phone]         Mala Bangura MD  02/11/23 8071

## 2023-02-11 NOTE — DISCHARGE INSTRUCTIONS
You were seen here in the emergency department for evaluation of fall, head injury.  Because you are on blood thinners, we did perform CT imaging of your head, your neck, and your face.  All of the CT scans do not show any evidence of acute bleeding, broken bones, or other acute abnormalities.  As we discussed previously, there is a little bit of a contusion, bruise to your right forehead area.  Continue to mature this area, you can use ice over it to help with the swelling.  You can use your typical at home pain medications such as Tylenol for any acute discomfort.  Return to the emergency department if you develop any numbness or tingling, loss of sensation for your upper and lower extremities, persistent headaches that do not resolve, changes in vision, or blurry vision.     Follow up with your primary care doctor as needed.

## 2023-02-11 NOTE — ED NOTES
Bed: [facility]  Expected date:   Expected time:   Means of arrival:   Comments:  SPF- 89 yo M slipped out of bed, head laceration.  +thinners

## 2023-02-11 NOTE — ED TRIAGE NOTES
Pt comes in with [employer]. Pt was sitting at edge of bed, stood up to go to the bathroom and slipped. Fell onto knees, hit R eyebrow. . Hematoma, bleeding controlled. No LOC. Chronic L knee pain. Pt on Xarelto. No other physical complaints.

## 2023-03-22 ENCOUNTER — LAB REQUISITION (OUTPATIENT)
Dept: LAB | Facility: CLINIC | Age: 88
End: 2023-03-22
Payer: MEDICARE

## 2023-03-22 DIAGNOSIS — E55.9 VITAMIN D DEFICIENCY, UNSPECIFIED: ICD-10-CM

## 2023-03-22 DIAGNOSIS — E34.9 ENDOCRINE DISORDER, UNSPECIFIED: ICD-10-CM

## 2023-03-22 DIAGNOSIS — E11.29 TYPE 2 DIABETES MELLITUS WITH OTHER DIABETIC KIDNEY COMPLICATION (H): ICD-10-CM

## 2023-03-22 DIAGNOSIS — E78.5 HYPERLIPIDEMIA, UNSPECIFIED: ICD-10-CM

## 2023-03-22 LAB
ALBUMIN SERPL BCG-MCNC: 3.7 G/DL (ref 3.5–5.2)
ALP SERPL-CCNC: 81 U/L (ref 40–129)
ALT SERPL W P-5'-P-CCNC: 15 U/L (ref 10–50)
ANION GAP SERPL CALCULATED.3IONS-SCNC: 13 MMOL/L (ref 7–15)
AST SERPL W P-5'-P-CCNC: 21 U/L (ref 10–50)
BILIRUB SERPL-MCNC: 0.6 MG/DL
BUN SERPL-MCNC: 25.7 MG/DL (ref 8–23)
CALCIUM SERPL-MCNC: 9 MG/DL (ref 8.2–9.6)
CHLORIDE SERPL-SCNC: 105 MMOL/L (ref 98–107)
CHOLEST SERPL-MCNC: 114 MG/DL
CREAT SERPL-MCNC: 1.39 MG/DL (ref 0.67–1.17)
DEPRECATED HCO3 PLAS-SCNC: 23 MMOL/L (ref 22–29)
GFR SERPL CREATININE-BSD FRML MDRD: 48 ML/MIN/1.73M2
GLUCOSE SERPL-MCNC: 121 MG/DL (ref 70–99)
HDLC SERPL-MCNC: 52 MG/DL
LDLC SERPL CALC-MCNC: 43 MG/DL
NONHDLC SERPL-MCNC: 62 MG/DL
POTASSIUM SERPL-SCNC: 4.5 MMOL/L (ref 3.4–5.3)
PROT SERPL-MCNC: 6.1 G/DL (ref 6.4–8.3)
PTH-INTACT SERPL-MCNC: 46 PG/ML (ref 15–65)
SODIUM SERPL-SCNC: 141 MMOL/L (ref 136–145)
TRIGL SERPL-MCNC: 95 MG/DL

## 2023-03-22 PROCEDURE — 82306 VITAMIN D 25 HYDROXY: CPT | Mod: ORL | Performed by: NURSE PRACTITIONER

## 2023-03-22 PROCEDURE — 83970 ASSAY OF PARATHORMONE: CPT | Mod: ORL | Performed by: NURSE PRACTITIONER

## 2023-03-22 PROCEDURE — 80053 COMPREHEN METABOLIC PANEL: CPT | Mod: ORL | Performed by: NURSE PRACTITIONER

## 2023-03-22 PROCEDURE — 80061 LIPID PANEL: CPT | Mod: ORL | Performed by: NURSE PRACTITIONER

## 2023-03-23 LAB — DEPRECATED CALCIDIOL+CALCIFEROL SERPL-MC: 33 UG/L (ref 20–75)

## 2023-08-25 ENCOUNTER — APPOINTMENT (OUTPATIENT)
Dept: CT IMAGING | Facility: HOSPITAL | Age: 88
End: 2023-08-25
Attending: EMERGENCY MEDICINE
Payer: MEDICARE

## 2023-08-25 ENCOUNTER — HOSPITAL ENCOUNTER (EMERGENCY)
Facility: HOSPITAL | Age: 88
Discharge: HOME | End: 2023-08-25
Attending: EMERGENCY MEDICINE | Admitting: EMERGENCY MEDICINE
Payer: MEDICARE

## 2023-08-25 VITALS
WEIGHT: 212 LBS | DIASTOLIC BLOOD PRESSURE: 82 MMHG | RESPIRATION RATE: 24 BRPM | HEART RATE: 64 BPM | TEMPERATURE: 98 F | OXYGEN SATURATION: 100 % | SYSTOLIC BLOOD PRESSURE: 132 MMHG | HEIGHT: 75 IN | BODY MASS INDEX: 26.36 KG/M2

## 2023-08-25 DIAGNOSIS — R29.810 FACIAL DROOP: ICD-10-CM

## 2023-08-25 LAB
ANION GAP SERPL CALCULATED.3IONS-SCNC: 12 MMOL/L (ref 7–15)
BUN SERPL-MCNC: 25.6 MG/DL (ref 8–23)
CALCIUM SERPL-MCNC: 9.3 MG/DL (ref 8.2–9.6)
CHLORIDE SERPL-SCNC: 106 MMOL/L (ref 98–107)
CREAT SERPL-MCNC: 1.35 MG/DL (ref 0.67–1.17)
DEPRECATED HCO3 PLAS-SCNC: 24 MMOL/L (ref 22–29)
ERYTHROCYTE [DISTWIDTH] IN BLOOD BY AUTOMATED COUNT: 13.4 % (ref 10–15)
GFR SERPL CREATININE-BSD FRML MDRD: 50 ML/MIN/1.73M2
GLUCOSE SERPL-MCNC: 160 MG/DL (ref 70–99)
HCT VFR BLD AUTO: 40.8 % (ref 40–53)
HGB BLD-MCNC: 13.2 G/DL (ref 13.3–17.7)
HOLD SPECIMEN: NORMAL
HOLD SPECIMEN: NORMAL
MCH RBC QN AUTO: 29.7 PG (ref 26.5–33)
MCHC RBC AUTO-ENTMCNC: 32.4 G/DL (ref 31.5–36.5)
MCV RBC AUTO: 92 FL (ref 78–100)
PLATELET # BLD AUTO: 177 10E3/UL (ref 150–450)
POTASSIUM SERPL-SCNC: 4.3 MMOL/L (ref 3.4–5.3)
RBC # BLD AUTO: 4.45 10E6/UL (ref 4.4–5.9)
SODIUM SERPL-SCNC: 142 MMOL/L (ref 136–145)
WBC # BLD AUTO: 6.1 10E3/UL (ref 4–11)

## 2023-08-25 PROCEDURE — 36415 COLL VENOUS BLD VENIPUNCTURE: CPT | Performed by: EMERGENCY MEDICINE

## 2023-08-25 PROCEDURE — 85027 COMPLETE CBC AUTOMATED: CPT | Performed by: EMERGENCY MEDICINE

## 2023-08-25 PROCEDURE — 93005 ELECTROCARDIOGRAM TRACING: CPT | Performed by: EMERGENCY MEDICINE

## 2023-08-25 PROCEDURE — 70498 CT ANGIOGRAPHY NECK: CPT | Mod: MG

## 2023-08-25 PROCEDURE — 80048 BASIC METABOLIC PNL TOTAL CA: CPT | Performed by: EMERGENCY MEDICINE

## 2023-08-25 PROCEDURE — 70496 CT ANGIOGRAPHY HEAD: CPT | Mod: MG

## 2023-08-25 PROCEDURE — 250N000011 HC RX IP 250 OP 636: Performed by: EMERGENCY MEDICINE

## 2023-08-25 PROCEDURE — 99285 EMERGENCY DEPT VISIT HI MDM: CPT | Mod: 25

## 2023-08-25 RX ORDER — IOPAMIDOL 755 MG/ML
75 INJECTION, SOLUTION INTRAVASCULAR ONCE
Status: COMPLETED | OUTPATIENT
Start: 2023-08-25 | End: 2023-08-25

## 2023-08-25 RX ADMIN — IOPAMIDOL 75 ML: 755 INJECTION, SOLUTION INTRAVENOUS at 15:45

## 2023-08-25 ASSESSMENT — ACTIVITIES OF DAILY LIVING (ADL): ADLS_ACUITY_SCORE: 35

## 2023-08-25 NOTE — ED TRIAGE NOTES
Left face numbness and and droop for past week. Reports noticing drooling from ride side. Not getting better so called PCP and was told to come in for MRI. Generalized weakness. Headaches come and go, no HA now. Does report some pressure on right side of head

## 2023-08-25 NOTE — ED NOTES
Assumed care of pt. Pt left facial numbness and droop for last week. Lives in own home by himself. Denies pain, cp, bvisual changes, dizziness, n/v. Does have a pacer. Equal grasps and equal feet.

## 2023-08-25 NOTE — DISCHARGE INSTRUCTIONS
Very subtle facial droop on the right side of the face.  Unfortunately because of your pacemaker we are not able to get an MRI performed today.  This CT/CTA of the head and neck however only shows age-related changes.  There is no signs of mass, tumor, bleeding on the brain or stroke.  I would anticipate that with 7 days of symptoms we should be seeing signs of stroke on the CT scan.  However if there were certainly a small stroke we could miss it.  If you wish to pursue stroke work-up further, would follow-up with your primary care doctor who can order an outpatient MRI of the brain.  This would need to be coordinated with a representative from your pacemaker device company, cardiology nurse, etc.  Symptoms may also be related to a Bell's palsy because of the associated paresthesias to the forehead/forehead involvement.  After a weeks worth of symptoms using steroids likely would not be helpful and would only cause hyperglycemia with your diabetes.

## 2023-08-25 NOTE — ED PROVIDER NOTES
EMERGENCY DEPARTMENT ENCOUNTER      NAME: Tre Van  AGE: 89 year old male  YOB: 1932  MRN: [id number]  EVALUATION DATE & TIME: No admission date for patient encounter.    PCP: Kayli Che    ED PROVIDER: Maryann Javed MD    Chief Complaint   Patient presents with    Facial Droop    Numbness         FINAL IMPRESSION:  1. Facial droop          ED COURSE & MEDICAL DECISION MAKING:    Pertinent Labs & Imaging studies reviewed. (See chart for details)  89 year old male with history of HTN, HLD, DM, CKD status post PPM for AV blockade who presents to the Emergency Department for evaluation of 1 week of right-sided facial droop and paresthesias to the right forehead.  Very subtle right-sided facial droop on examination.  Differential includes CVA, TIA, Bell's palsy.    Patient initially seen evaluated by myself in triage area due to boarding crisis.  Unfortunately not able to get an MRI because patient has PPM and this needs to be coordinated for an advanced.  Patient therefore will get CT/CTA.  Twelve-lead EKG shows paced rhythm.  CBC and BMP notable for baseline renal insufficiency.  CT/CTA head and neck really unremarkable.  There is no signs of stroke, and after a week of symptoms I would expect that we would see something, but certainly there could be a small missed CVA.  This was inez with patient and son at bedside.  If they would like to pursue this further, could arrange through his PCP as an outpatient Monday through Friday with device and cardiology representation at MRI.  Certainly this could also be a Bell's palsy.  After a week of symptoms he is not likely to benefit from steroids and this would only make his hyperglycemia worse with his diabetes.  Therefore recommend home with observation and PCP follow-up, warning signs return to ED discussed.      ED Course as of 08/25/23 1728   Fri Aug 25, 2023   1442 2:30 PM I met with the patient   1506 Patient has PPM, informed that  we are not able to get MRI performed, and we will proceed with CTA instead   1512 Creatinine(!): 1.35  Baseline        Medical Decision Making    History:  Supplemental history from: Family Member/Significant Other  External Record(s) reviewed: Other: Previous EKG    Work Up:  Chart documentation includes differential considered and any EKGs or imaging independently interpreted by provider, see MDM  In additional to work up documented, I considered the following work up: see MDM    External consultation:  Discussion of management with another provider: N/A    Complicating factors:  Care impacted by chronic illness: Diabetes, Hyperlipidemia, and Hypertension  Care affected by social determinants of health: Access to care referred to ED    Disposition considerations: Discharge. No recommendations on prescription strength medication(s). See documentation for any additional details.        At the conclusion of the encounter I discussed the results of all of the tests and the disposition. The questions were answered. The patient or family acknowledged understanding and was agreeable with the care plan.      MEDICATIONS GIVEN IN THE EMERGENCY:  Medications   iopamidol (ISOVUE-370) solution 75 mL (75 mLs Intravenous $Given 8/25/23 9442)       NEW PRESCRIPTIONS STARTED AT TODAY'S ER VISIT  Discharge Medication List as of 8/25/2023  4:39 PM             =================================================================    HPI    Patient information was obtained from: Patient    Use of Intrepreter: N/A      Tre SID Van is a 89 year old male with pertinent medical history of HTN,HLD, type 2 diabetes and atrial fibrillation and who presents for evaluation of facial droop and numbness.     Patient reports endorsing face droopiness for the past week.He states his right side face feels numb and tingling. The facial droop has not gotten worse or better. Patient does not present garbled speech and has no history of stroke.  Patient denies any other associated symptoms right now.  Called PCP who referred patient to ED for MRI.      PAST MEDICAL HISTORY:  Past Medical History:   Diagnosis Date    Diabetes (H)     Pacemaker        PAST SURGICAL HISTORY:  Past Surgical History:   Procedure Laterality Date    EYE SURGERY Bilateral     cataracts    HERNIA REPAIR Left     inguinal    IMPLANT PACEMAKER Left 10/12/2012    JOINT REPLACEMENT Left 2006    shoulder    OTHER SURGICAL HISTORY Bilateral 2016    gel injections to both knees    ND SIGMOIDOSCOPY FLX CONTROL BLEEDING N/A 5/3/2017    Procedure: SIGMOIDOSCOPY with biopsy ;  Surgeon: Maddison Taylor MD;  Location[facility];  Service: Gastroenterology    RELEASE CARPAL TUNNEL Left     TONSILLECTOMY         CURRENT MEDICATIONS:    Prior to Admission Medications   Prescriptions Last Dose Informant Patient Reported? Taking?   acetaminophen (TYLENOL) 500 MG tablet   Yes No   Sig: [ACETAMINOPHEN (TYLENOL) 500 MG TABLET] Take 1,000 mg by mouth 3 (three) times a day as needed for pain.   aspirin 81 mg chewable tablet   No No   Sig: [ASPIRIN 81 MG CHEWABLE TABLET] Chew 1 tablet (81 mg total) daily.   fluticasone (FLONASE) 50 mcg/actuation nasal spray   No No   Sig: [FLUTICASONE (FLONASE) 50 MCG/ACTUATION NASAL SPRAY] 1 spray into each nostril daily as needed for rhinitis.   metFORMIN (GLUCOPHAGE) 500 MG tablet   No No   Sig: [METFORMIN (GLUCOPHAGE) 500 MG TABLET] Take 0.5 tablets (250 mg total) by mouth 2 (two) times a day with meals.   rivaroxaban 20 mg Tab   No No   Sig: [RIVAROXABAN 20 MG TAB] Take 1 tablet (20 mg total) by mouth daily with supper.   simvastatin (ZOCOR) 40 MG tablet   No No   Sig: [SIMVASTATIN (ZOCOR) 40 MG TABLET] Take 1 tablet (40 mg total) by mouth at bedtime.   sotalol (BETAPACE) 80 MG tablet   Yes No   Sig: [SOTALOL (BETAPACE) 80 MG TABLET] Take  mg by mouth 2 (two) times a day. Take 1.5 tablets (120 mg) every morning and 1 tablet (80 mg) every evening   vit  "A/vit C/vit E/zinc/copper (PRESERVISION AREDS ORAL)   Yes No   Sig: [VIT A/VIT C/VIT E/ZINC/COPPER (PRESERVISION AREDS ORAL)] Take 1 tablet by mouth 2 (two) times a day.      Facility-Administered Medications: None       ALLERGIES:  Allergies   Allergen Reactions    Grapefruit [Grapefruit Extract] Unknown     Reaction: eye redness, bleeding gums       FAMILY HISTORY:  Family History   Problem Relation Age of Onset    No Known Problems Mother         Bled to death age 87    Heart Disease Father 56.00    Colitis Son        SOCIAL HISTORY:  Social History     Tobacco Use    Smoking status: Former     Packs/day: 1.50     Years: 23.00     Pack years: 34.50     Types: Cigarettes     Quit date: 1967     Years since quittin.6    Smokeless tobacco: Never   Substance Use Topics    Alcohol use: Yes     Comment: Alcoholic Drinks/day: Small amounts of alcohol on special occasions    Drug use: No        VITALS:  Patient Vitals for the past 24 hrs:   BP Temp Temp src Pulse Resp SpO2 Height Weight   23 1639 132/82 -- -- 64 24 100 % -- --   23 1624 129/74 -- -- 64 11 100 % -- --   23 1609 -- -- -- 64 15 100 % -- --   23 1556 -- -- -- 64 21 99 % -- --   23 1554 124/70 -- -- 64 21 99 % -- --   23 1541 127/71 -- -- -- -- -- -- --   23 1519 120/75 -- -- 64 24 -- -- --   23 1421 100/65 98  F (36.7  C) Oral 83 20 96 % 1.905 m (6' 3\") 96.2 kg (212 lb)       PHYSICAL EXAM    General Appearance: Well-appearing, well-nourished, no acute distress   Head:  Normocephalic, atraumatic  Eyes:  PERRL, conjunctiva/corneas clear, EOM's intact  ENT:  Lips, mucosa, and tongue normal; membranes are moist without pallor  Neck:  Supple  Cardio:  Regular rate and rhythm  Pulm:  No respiratory distress  Extremities: Ambulates slowly with cane  Skin:  Skin warm, dry, no rashes  Neuro:  Alert and oriented ×3, answers questions and follows commands appropriately.  Cranial nerves III through XII intact " other than very subtle flattening of the right nasolabial fold and subjective paresthesias to the right forehead, moving all extremities with 5-5 upper lower extremity strength, no gross sensory defects, no appreciable ataxia, aphasia or dysarthria     RADIOLOGY/LABS:  Reviewed all pertinent imaging. Please see official radiology report. All pertinent labs reviewed and interpreted.    Results for orders placed or performed during the hospital encounter of 08/25/23   CTA Head Neck with Contrast    Impression    IMPRESSION:   HEAD CT:  1.  No evidence of acute intracranial hemorrhage or mass effect.  2.  Moderate nonspecific white matter changes.  3.  Moderate brain parenchymal volume loss.    HEAD CTA:   1.  No evidence of pathologic vascular occlusion or saccular aneurysm within the visualized intracranial circulation by CT angiography.    NECK CTA:  1.  No evidence of hemodynamically significant stenosis within either internal carotid artery within the neck.  2.  No evidence of arterial dissection.   Basic metabolic panel   Result Value Ref Range    Sodium 142 136 - 145 mmol/L    Potassium 4.3 3.4 - 5.3 mmol/L    Chloride 106 98 - 107 mmol/L    Carbon Dioxide (CO2) 24 22 - 29 mmol/L    Anion Gap 12 7 - 15 mmol/L    Urea Nitrogen 25.6 (H) 8.0 - 23.0 mg/dL    Creatinine 1.35 (H) 0.67 - 1.17 mg/dL    Calcium 9.3 8.2 - 9.6 mg/dL    Glucose 160 (H) 70 - 99 mg/dL    GFR Estimate 50 (L) >60 mL/min/1.73m2   CBC with platelets   Result Value Ref Range    WBC Count 6.1 4.0 - 11.0 10e3/uL    RBC Count 4.45 4.40 - 5.90 10e6/uL    Hemoglobin 13.2 (L) 13.3 - 17.7 g/dL    Hematocrit 40.8 40.0 - 53.0 %    MCV 92 78 - 100 fL    MCH 29.7 26.5 - 33.0 pg    MCHC 32.4 31.5 - 36.5 g/dL    RDW 13.4 10.0 - 15.0 %    Platelet Count 177 150 - 450 10e3/uL   Extra Blue Top Tube   Result Value Ref Range    Hold Specimen JIC    Extra Red Top Tube   Result Value Ref Range    Hold Specimen JIC        EKG:  Performed at: 2:26 PM    Impression:  ventricular paced rhythm     Rate: 65 BPM  Rhythm: ventricular paced rhythm   Axis: -64  QRS Interval: 154 ms  QTc Interval: 486 ms  ST Changes: none   Comparison: 4/26/22  I have independently reviewed and interpreted the EKG(s) documented above.      The creation of this record is based on the scribe s observations of the work being performed by Maryann Javed MD and the provider s statements to them. It was created on her behalf by Shanita Diehl, a trained medical scribe. This document has been checked and approved by the attending provider.    Maryann Javed MD  Emergency Medicine  [facility] EMERGENCY DEPARTMENT  [address] [place] [id number] [phone]  Dept: [phone]     Maryann Javed MD  08/25/23 6475

## 2023-09-01 LAB
ATRIAL RATE - MUSE: 72 BPM
DIASTOLIC BLOOD PRESSURE - MUSE: NORMAL MMHG
INTERPRETATION ECG - MUSE: NORMAL
P AXIS - MUSE: NORMAL DEGREES
PR INTERVAL - MUSE: NORMAL MS
QRS DURATION - MUSE: 154 MS
QT - MUSE: 468 MS
QTC - MUSE: 486 MS
R AXIS - MUSE: -64 DEGREES
SYSTOLIC BLOOD PRESSURE - MUSE: NORMAL MMHG
T AXIS - MUSE: 88 DEGREES
VENTRICULAR RATE- MUSE: 65 BPM

## 2023-09-05 ENCOUNTER — LAB REQUISITION (OUTPATIENT)
Dept: LAB | Facility: CLINIC | Age: 88
End: 2023-09-05
Payer: MEDICARE

## 2023-09-05 DIAGNOSIS — R29.810 FACIAL WEAKNESS: ICD-10-CM

## 2023-09-05 DIAGNOSIS — E11.21 TYPE 2 DIABETES MELLITUS WITH DIABETIC NEPHROPATHY (H): ICD-10-CM

## 2023-09-05 LAB
ANION GAP SERPL CALCULATED.3IONS-SCNC: 14 MMOL/L (ref 7–15)
BUN SERPL-MCNC: 24.3 MG/DL (ref 8–23)
CALCIUM SERPL-MCNC: 9 MG/DL (ref 8.2–9.6)
CHLORIDE SERPL-SCNC: 104 MMOL/L (ref 98–107)
CREAT SERPL-MCNC: 1.27 MG/DL (ref 0.67–1.17)
DEPRECATED HCO3 PLAS-SCNC: 22 MMOL/L (ref 22–29)
GFR SERPL CREATININE-BSD FRML MDRD: 53 ML/MIN/1.73M2
GLUCOSE SERPL-MCNC: 139 MG/DL (ref 70–99)
POTASSIUM SERPL-SCNC: 4.2 MMOL/L (ref 3.4–5.3)
SODIUM SERPL-SCNC: 140 MMOL/L (ref 136–145)

## 2023-09-05 PROCEDURE — 86618 LYME DISEASE ANTIBODY: CPT | Mod: ORL | Performed by: NURSE PRACTITIONER

## 2023-09-05 PROCEDURE — 80048 BASIC METABOLIC PNL TOTAL CA: CPT | Mod: ORL | Performed by: NURSE PRACTITIONER

## 2023-09-06 LAB — B BURGDOR IGG+IGM SER QL: 0.05

## 2023-09-11 ENCOUNTER — DOCUMENTATION ONLY (OUTPATIENT)
Dept: CARDIOLOGY | Facility: CLINIC | Age: 88
End: 2023-09-11
Payer: MEDICARE

## 2023-09-11 NOTE — PROGRESS NOTES
9/11/2023:  Is the implanted device safe for MRI Exam?  Yes  Is this device 3T compatible? Yes  Device Type: Pacemaker      Device Information: [employer], [employer] Device: [employer] MRI      Cardiology Orders for Device Programming     -- Yes -- The patient has a MRI conditional pulse generator and leads from the same     -- Yes -- The pulse generator and leads have been implanted for at least 6 weeks. (Does not apply to [employer] devices)    -- Yes-- The device is implanted in the right or left pectoral region    -- Yes -- There are not any additional active cardiac devices, abandoned leads, lead extenders or adapters    -- Yes -- The device lead impedance measurements are within the normal range. (Manufacture recommendations: Medtronic Advisa and Revo 200-1,500 ohms; Medtronic ICD and CRT's 200-3000 ohms and defibrillation lead impedance   ohms)    -- Yes -- If the patient is pacemaker dependent the thresholds are less than or equal to 2.0V @ 0.4ms.    -- Yes -- RA and RV leads are programmed to bipolar pacing operation or pacing off. If no, CONTACT THE DEVICE REP FOR PROGRAMMING           Date of last Remote Device check: 9/5/2023  Results of last Device check:  1.   Right atrium impedance: n/a  2.   Right ventricle impedance: 430  3.   Left ventricle impedance: n/a     4.   Right atrium threshold: n/a  5.   Right ventricle threshold: 2.0V@0.5ms  6.   Left ventricle threshold: n/a     Device programming during the scan guidelines   Pacing Mode (check one): VOO  Pacing Rate: 80  bpm   Isabelle Heck Device RN

## 2023-10-05 ENCOUNTER — HOSPITAL ENCOUNTER (OUTPATIENT)
Dept: MRI IMAGING | Facility: CLINIC | Age: 88
Discharge: HOME | End: 2023-10-05
Attending: NURSE PRACTITIONER
Payer: MEDICARE

## 2023-10-05 ENCOUNTER — HOSPITAL ENCOUNTER (OUTPATIENT)
Dept: RADIOLOGY | Facility: CLINIC | Age: 88
Discharge: HOME | End: 2023-10-05
Attending: NURSE PRACTITIONER
Payer: MEDICARE

## 2023-10-05 VITALS — RESPIRATION RATE: 20 BRPM | HEART RATE: 79 BPM | OXYGEN SATURATION: 99 %

## 2023-10-05 DIAGNOSIS — R29.810 FACIAL DROOP: ICD-10-CM

## 2023-10-05 PROCEDURE — 70551 MRI BRAIN STEM W/O DYE: CPT

## 2023-10-05 PROCEDURE — 71045 X-RAY EXAM CHEST 1 VIEW: CPT

## 2023-10-18 ENCOUNTER — APPOINTMENT (OUTPATIENT)
Dept: CT IMAGING | Facility: CLINIC | Age: 88
End: 2023-10-18
Attending: STUDENT IN AN ORGANIZED HEALTH CARE EDUCATION/TRAINING PROGRAM
Payer: MEDICARE

## 2023-10-18 ENCOUNTER — HOSPITAL ENCOUNTER (EMERGENCY)
Facility: CLINIC | Age: 88
Discharge: HOME | End: 2023-10-18
Attending: STUDENT IN AN ORGANIZED HEALTH CARE EDUCATION/TRAINING PROGRAM | Admitting: STUDENT IN AN ORGANIZED HEALTH CARE EDUCATION/TRAINING PROGRAM
Payer: MEDICARE

## 2023-10-18 VITALS
BODY MASS INDEX: 26.36 KG/M2 | TEMPERATURE: 98 F | DIASTOLIC BLOOD PRESSURE: 74 MMHG | HEART RATE: 65 BPM | OXYGEN SATURATION: 100 % | HEIGHT: 75 IN | RESPIRATION RATE: 19 BRPM | WEIGHT: 212 LBS | SYSTOLIC BLOOD PRESSURE: 124 MMHG
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
## 2023-10-18 DIAGNOSIS — S09.90XA CLOSED HEAD INJURY, INITIAL ENCOUNTER: ICD-10-CM

## 2023-10-18 PROCEDURE — 70450 CT HEAD/BRAIN W/O DYE: CPT | Mod: ME

## 2023-10-18 PROCEDURE — 250N000013 HC RX MED GY IP 250 OP 250 PS 637: Performed by: STUDENT IN AN ORGANIZED HEALTH CARE EDUCATION/TRAINING PROGRAM

## 2023-10-18 PROCEDURE — 99284 EMERGENCY DEPT VISIT MOD MDM: CPT | Mod: 25

## 2023-10-18 RX ORDER — ACETAMINOPHEN 325 MG/1
975 TABLET ORAL ONCE
Status: COMPLETED | OUTPATIENT
Start: 2023-10-18 | End: 2023-10-18

## 2023-10-18 RX ADMIN — ACETAMINOPHEN 975 MG: 325 TABLET ORAL at 15:20

## 2023-10-18 ASSESSMENT — ACTIVITIES OF DAILY LIVING (ADL): ADLS_ACUITY_SCORE: 35

## 2023-10-18 NOTE — ED TRIAGE NOTES
Pt presenting via EMS due to a mechanical fall that occurred at roughly 1:15 PM. Pt reports he was being seen at [facility] for his left knee. Pt was returning to his car after his appointment and his left knee gave out causing him to fall. Pt reports he hit his head, on thinner due to pacemaker. Pt noted to have right elbow skin tear.      Triage Assessment (Adult)       Row Name 10/18/23 1442          Triage Assessment    Airway WDL WDL        Respiratory WDL    Respiratory WDL WDL        Cardiac WDL    Cardiac WDL WDL        Peripheral/Neurovascular WDL    Peripheral Neurovascular WDL WDL        Cognitive/Neuro/Behavioral WDL    Cognitive/Neuro/Behavioral WDL WDL

## 2023-10-18 NOTE — ED PROVIDER NOTES
EMERGENCY DEPARTMENT ENCOUNTER       ED Course & Medical Decision Making     2:53 PM Met with the patient and performed my initial exam.    Final Impression  89 year old male presents for evaluation of closed head injury after a mechanical fall while walking into the orthopedic clinic for a cortisone shot in his knee.  Patient states he was getting out of his car, got his walker out and then when he went to turn around to use his walker lost his footing and fell striking his head.  Patient is on chronic rivaroxaban.  Denies LOC or headache, no focal neurologic weakness.  Denies significant bony injuries, no neck pain or acute knee pain or back pain.  Patient has a 3 cm skin tear over his right elbow, not amenable to closure, thus was cleaned and dressed with a nonadherent dressing.  CT head negative.  Will discharge home with son.  All questions answered.    Prior to making a final disposition on this patient the results of patient's tests and other diagnostic studies were discussed with the patient. All questions were answered. Patient expressed understanding of the plan and was amenable to it.    Medical Decision Making    History:  Supplemental history from: Son  External Record(s) reviewed as documented below;  ED visit to [facility] Emergency Department for facial droop and numbness on 8/25/2023. Twelve-lead EKG shows paced rhythm. CT/CTA head and neck really unremarkable. No signs of stroke. Recommend home with observation and PCP follow-up.    Work Up:  Chart documentation includes differential considered and any EKGs or imaging independently interpreted by provider, where specified.  In additional to work up documented, I considered the following work up: Considered x-ray imaging of the knee given his fall, though has good range of motion of bilateral knees, no new knee pain.  DDx considered but not limited to: Intracranial bleeding, skull fracture, cervical spine fracture, knee  "fracture      Complicating factors:  Care impacted by chronic illness: Chronic Kidney Disease, Diabetes, Hyperlipidemia, and Hypertension  Care affected by social determinants of health: N/A    Disposition considerations: Discharge. No recommendations on prescription strength medication(s). I considered admission, but discharged patient after significant clinical improvement.      Medications   acetaminophen (TYLENOL) tablet 975 mg (975 mg Oral $Given 10/18/23 1520)       Discharge Medication List as of 10/18/2023  4:19 PM          Final Impression     1. Fall, initial encounter    2. Closed head injury, initial encounter      Chief Complaint     Chief Complaint   Patient presents with    Fall     HPI     Tre Van is a 89 year old male who presents for evaluation of a fall.    Patient notes he was on his way to [facility] to get a shot for his left knee, noting he never got to [facility] because he fell. He notes he got off his car, and got his walker from his truck and closed the the car door, he then suddenly fell on concrete. He reports injured his right arm, noting he has an injury right above his right elbow. He has a bruise on his right hand. He did hit his head, mentioning pain in the top of his head, localized along the right side. Endorses a \"slight headache. Denies losing consciousness and injury to his knees. Reports he never had a skin tear before. He does take blood thinners. No other reported complaints or concerns at this time.     I, Sakshi Reddy am serving as a scribe to document services personally performed by Dr. Karri Nelson MD, based on my observation and the provider's statements to me. I, Dr. Karri Nelson MD attest that Sakshi Reddy is acting in a scribe capacity, has observed my performance of the services and has documented them in accordance with my direction.    Physical Exam     /74   Pulse 65   Temp 98  F (36.7  C) (Oral)   Resp 19   Ht 1.905 m (6' 3\")   Wt 96.2 kg " (212 lb)   SpO2 100%   BMI 26.50 kg/m    Constitutional: Awake, alert, in no acute distress.  Head: Normocephalic, no obvious contusions, hematomas, or lacerations.  No midline neck pain  ENT: Mucous membranes moist.  Eyes: Conjunctiva normal.  Respiratory: Respirations even, unlabored, in no acute respiratory distress.  Cardiovascular: Regular rate and rhythm. Good peripheral perfusion.  GI: Abdomen soft, non-tender.  Musculoskeletal: Moves all 4 extremities equally.  Good range of motion of bilateral knees, no knee effusions palpable.  No midline back pain  Integument: Warm, dry.  3 x 3 cm skin tear to right elbow.  Neurologic: Alert & oriented x 3. Normal speech. Grossly normal motor and sensory function. No focal deficits noted.  Psychiatric: Normal mood    Labs & Imaging     Imaging reviewed and independently interpreted as below;   CT head images personally reviewed, no intracranial bleeding, no skull fractures appreciated.    Results for orders placed or performed during the hospital encounter of 10/18/23   Head CT w/o contrast    Impression    IMPRESSION:  1.  No CT evidence for acute intracranial process.  2.  Brain atrophy and presumed chronic microvascular ischemic changes as above.        Karri Nelson MD  10/18/23 6716

## 2024-02-06 ENCOUNTER — LAB REQUISITION (OUTPATIENT)
Dept: LAB | Facility: CLINIC | Age: 89
End: 2024-02-06
Payer: MEDICARE

## 2024-02-06 DIAGNOSIS — I48.0 PAROXYSMAL ATRIAL FIBRILLATION (H): ICD-10-CM

## 2024-02-07 LAB
ANION GAP SERPL CALCULATED.3IONS-SCNC: 9 MMOL/L (ref 7–15)
BUN SERPL-MCNC: 28.3 MG/DL (ref 8–23)
CALCIUM SERPL-MCNC: 8.9 MG/DL (ref 8.2–9.6)
CHLORIDE SERPL-SCNC: 107 MMOL/L (ref 98–107)
CREAT SERPL-MCNC: 1.38 MG/DL (ref 0.67–1.17)
DEPRECATED HCO3 PLAS-SCNC: 25 MMOL/L (ref 22–29)
EGFRCR SERPLBLD CKD-EPI 2021: 48 ML/MIN/1.73M2
GLUCOSE SERPL-MCNC: 101 MG/DL (ref 70–99)
POTASSIUM SERPL-SCNC: 4.1 MMOL/L (ref 3.4–5.3)
SODIUM SERPL-SCNC: 141 MMOL/L (ref 135–145)

## 2024-02-07 PROCEDURE — P9604 ONE-WAY ALLOW PRORATED TRIP: HCPCS | Performed by: INTERNAL MEDICINE

## 2024-02-07 PROCEDURE — 36415 COLL VENOUS BLD VENIPUNCTURE: CPT | Performed by: INTERNAL MEDICINE

## 2024-02-07 PROCEDURE — 80048 BASIC METABOLIC PNL TOTAL CA: CPT | Performed by: INTERNAL MEDICINE

## 2024-10-03 ENCOUNTER — LAB REQUISITION (OUTPATIENT)
Dept: LAB | Facility: CLINIC | Age: 89
End: 2024-10-03
Payer: MEDICARE

## 2024-10-03 DIAGNOSIS — E78.5 HYPERLIPIDEMIA, UNSPECIFIED: ICD-10-CM

## 2024-10-03 DIAGNOSIS — E11.29 TYPE 2 DIABETES MELLITUS WITH OTHER DIABETIC KIDNEY COMPLICATION (H): ICD-10-CM

## 2024-10-03 DIAGNOSIS — E55.9 VITAMIN D DEFICIENCY, UNSPECIFIED: ICD-10-CM

## 2024-10-03 PROCEDURE — 82306 VITAMIN D 25 HYDROXY: CPT | Mod: ORL | Performed by: NURSE PRACTITIONER

## 2024-10-03 PROCEDURE — 80053 COMPREHEN METABOLIC PANEL: CPT | Mod: ORL | Performed by: NURSE PRACTITIONER

## 2024-10-03 PROCEDURE — 82043 UR ALBUMIN QUANTITATIVE: CPT | Mod: ORL | Performed by: NURSE PRACTITIONER

## 2024-10-03 PROCEDURE — 80061 LIPID PANEL: CPT | Mod: ORL | Performed by: NURSE PRACTITIONER

## 2024-10-04 LAB
ALBUMIN SERPL BCG-MCNC: 3.8 G/DL (ref 3.5–5.2)
ALP SERPL-CCNC: 79 U/L (ref 40–150)
ALT SERPL W P-5'-P-CCNC: 16 U/L (ref 0–70)
ANION GAP SERPL CALCULATED.3IONS-SCNC: 12 MMOL/L (ref 7–15)
AST SERPL W P-5'-P-CCNC: 21 U/L (ref 0–45)
BILIRUB SERPL-MCNC: 0.4 MG/DL
BUN SERPL-MCNC: 26.5 MG/DL (ref 8–23)
CALCIUM SERPL-MCNC: 9.2 MG/DL (ref 8.8–10.4)
CHLORIDE SERPL-SCNC: 104 MMOL/L (ref 98–107)
CHOLEST SERPL-MCNC: 110 MG/DL
CREAT SERPL-MCNC: 1.37 MG/DL (ref 0.67–1.17)
CREAT UR-MCNC: 79 MG/DL
EGFRCR SERPLBLD CKD-EPI 2021: 48 ML/MIN/1.73M2
FASTING STATUS PATIENT QL REPORTED: ABNORMAL
FASTING STATUS PATIENT QL REPORTED: ABNORMAL
GLUCOSE SERPL-MCNC: 157 MG/DL (ref 70–99)
HCO3 SERPL-SCNC: 24 MMOL/L (ref 22–29)
HDLC SERPL-MCNC: 49 MG/DL
LDLC SERPL CALC-MCNC: 28 MG/DL
MICROALBUMIN UR-MCNC: <12 MG/L
MICROALBUMIN/CREAT UR: NORMAL MG/G{CREAT}
NONHDLC SERPL-MCNC: 61 MG/DL
POTASSIUM SERPL-SCNC: 4.4 MMOL/L (ref 3.4–5.3)
PROT SERPL-MCNC: 6.5 G/DL (ref 6.4–8.3)
SODIUM SERPL-SCNC: 140 MMOL/L (ref 135–145)
TRIGL SERPL-MCNC: 166 MG/DL
VIT D+METAB SERPL-MCNC: 27 NG/ML (ref 20–50)

## 2025-04-03 ENCOUNTER — LAB REQUISITION (OUTPATIENT)
Dept: LAB | Facility: CLINIC | Age: OVER 89
End: 2025-04-03
Payer: MEDICARE

## 2025-04-03 DIAGNOSIS — E11.29 TYPE 2 DIABETES MELLITUS WITH OTHER DIABETIC KIDNEY COMPLICATION (H): ICD-10-CM

## 2025-04-03 DIAGNOSIS — E78.5 HYPERLIPIDEMIA, UNSPECIFIED: ICD-10-CM

## 2025-04-03 PROCEDURE — 80053 COMPREHEN METABOLIC PANEL: CPT | Mod: ORL | Performed by: NURSE PRACTITIONER

## 2025-04-03 PROCEDURE — 80061 LIPID PANEL: CPT | Mod: ORL | Performed by: NURSE PRACTITIONER

## 2025-04-04 LAB
ALBUMIN SERPL BCG-MCNC: 4 G/DL (ref 3.5–5.2)
ALP SERPL-CCNC: 91 U/L (ref 40–150)
ALT SERPL W P-5'-P-CCNC: 12 U/L (ref 0–70)
ANION GAP SERPL CALCULATED.3IONS-SCNC: 15 MMOL/L (ref 7–15)
AST SERPL W P-5'-P-CCNC: 21 U/L (ref 0–45)
BILIRUB SERPL-MCNC: 0.7 MG/DL
BUN SERPL-MCNC: 26.1 MG/DL (ref 8–23)
CALCIUM SERPL-MCNC: 9.6 MG/DL (ref 8.8–10.4)
CHLORIDE SERPL-SCNC: 102 MMOL/L (ref 98–107)
CHOLEST SERPL-MCNC: 103 MG/DL
CREAT SERPL-MCNC: 1.3 MG/DL (ref 0.67–1.17)
EGFRCR SERPLBLD CKD-EPI 2021: 52 ML/MIN/1.73M2
FASTING STATUS PATIENT QL REPORTED: NO
FASTING STATUS PATIENT QL REPORTED: NO
GLUCOSE SERPL-MCNC: 135 MG/DL (ref 70–99)
HCO3 SERPL-SCNC: 22 MMOL/L (ref 22–29)
HDLC SERPL-MCNC: 47 MG/DL
LDLC SERPL CALC-MCNC: 31 MG/DL
NONHDLC SERPL-MCNC: 56 MG/DL
POTASSIUM SERPL-SCNC: 4.4 MMOL/L (ref 3.4–5.3)
PROT SERPL-MCNC: 7.1 G/DL (ref 6.4–8.3)
SODIUM SERPL-SCNC: 139 MMOL/L (ref 135–145)
TRIGL SERPL-MCNC: 123 MG/DL

## 2025-06-23 ENCOUNTER — LAB REQUISITION (OUTPATIENT)
Dept: LAB | Facility: CLINIC | Age: OVER 89
End: 2025-06-23
Payer: MEDICARE

## 2025-06-23 DIAGNOSIS — K80.50 CALCULUS OF BILE DUCT WITHOUT CHOLANGITIS OR CHOLECYSTITIS WITHOUT OBSTRUCTION: ICD-10-CM

## 2025-06-27 LAB
ALBUMIN SERPL BCG-MCNC: 3.4 G/DL (ref 3.5–5.2)
ALP SERPL-CCNC: 528 U/L (ref 40–150)
ALT SERPL W P-5'-P-CCNC: 55 U/L (ref 0–70)
AST SERPL W P-5'-P-CCNC: 80 U/L (ref 0–45)
BILIRUB DIRECT SERPL-MCNC: 1.02 MG/DL (ref 0–0.45)
BILIRUB SERPL-MCNC: 1.6 MG/DL
ERYTHROCYTE [DISTWIDTH] IN BLOOD BY AUTOMATED COUNT: 15.2 % (ref 10–15)
HCT VFR BLD AUTO: 40.2 % (ref 40–53)
HGB BLD-MCNC: 12.5 G/DL (ref 13.3–17.7)
MCH RBC QN AUTO: 30.4 PG (ref 26.5–33)
MCHC RBC AUTO-ENTMCNC: 31.1 G/DL (ref 31.5–36.5)
MCV RBC AUTO: 98 FL (ref 78–100)
PLATELET # BLD AUTO: 226 10E3/UL (ref 150–450)
PROT SERPL-MCNC: 6.3 G/DL (ref 6.4–8.3)
RBC # BLD AUTO: 4.11 10E6/UL (ref 4.4–5.9)
WBC # BLD AUTO: 4.7 10E3/UL (ref 4–11)

## 2025-06-27 PROCEDURE — 85027 COMPLETE CBC AUTOMATED: CPT | Mod: ORL | Performed by: NURSE PRACTITIONER

## 2025-06-27 PROCEDURE — 80076 HEPATIC FUNCTION PANEL: CPT | Mod: ORL | Performed by: NURSE PRACTITIONER

## 2025-06-27 PROCEDURE — P9604 ONE-WAY ALLOW PRORATED TRIP: HCPCS | Mod: ORL | Performed by: NURSE PRACTITIONER

## 2025-06-27 PROCEDURE — 36415 COLL VENOUS BLD VENIPUNCTURE: CPT | Mod: ORL | Performed by: NURSE PRACTITIONER
